# Patient Record
Sex: MALE | Race: WHITE | HISPANIC OR LATINO | Employment: UNEMPLOYED | URBAN - METROPOLITAN AREA
[De-identification: names, ages, dates, MRNs, and addresses within clinical notes are randomized per-mention and may not be internally consistent; named-entity substitution may affect disease eponyms.]

---

## 2018-01-10 NOTE — MISCELLANEOUS
Provider Comments  Provider Comments:   No show for 8/10 appointment        Signatures   Electronically signed by : Racheal Stark OM; Aug 10 2016 11:00AM EST                       (Author)    Electronically signed by : JEYSON Lyn ; Aug 10 2016 11:01AM EST

## 2018-01-18 NOTE — MISCELLANEOUS
Provider Comments  Provider Comments:   PATIENT NO SHOWED 7- APPT  Signatures   Electronically signed by :  Julio Lopez; Sep 19 2016  9:16AM EST                       (Author)

## 2019-09-25 ENCOUNTER — HOSPITAL ENCOUNTER (EMERGENCY)
Facility: HOSPITAL | Age: 24
Discharge: HOME | End: 2019-09-25
Attending: EMERGENCY MEDICINE | Admitting: EMERGENCY MEDICINE
Payer: COMMERCIAL

## 2019-09-25 VITALS
BODY MASS INDEX: 18.17 KG/M2 | TEMPERATURE: 97.8 F | DIASTOLIC BLOOD PRESSURE: 92 MMHG | SYSTOLIC BLOOD PRESSURE: 120 MMHG | HEART RATE: 125 BPM | RESPIRATION RATE: 18 BRPM | WEIGHT: 116 LBS | OXYGEN SATURATION: 99 %

## 2019-09-25 DIAGNOSIS — E10.9 TYPE 1 DIABETES (HCC): Primary | ICD-10-CM

## 2019-09-25 PROCEDURE — 99284 EMERGENCY DEPT VISIT MOD MDM: CPT | Performed by: EMERGENCY MEDICINE

## 2019-09-25 PROCEDURE — 99282 EMERGENCY DEPT VISIT SF MDM: CPT

## 2019-09-25 PROCEDURE — 93005 ELECTROCARDIOGRAM TRACING: CPT

## 2019-09-25 RX ORDER — INSULIN GLARGINE 100 [IU]/ML
20 INJECTION, SOLUTION SUBCUTANEOUS
Qty: 10 ML | Refills: 0 | Status: SHIPPED | OUTPATIENT
Start: 2019-09-25 | End: 2019-11-06

## 2019-09-25 RX ORDER — BLOOD-GLUCOSE METER
1 KIT MISCELLANEOUS AS NEEDED
Qty: 1 EACH | Refills: 0 | Status: SHIPPED | OUTPATIENT
Start: 2019-09-25 | End: 2019-11-06

## 2019-09-25 NOTE — ED ATTENDING ATTESTATION
9/25/2019  I, Mitesh Bowman MD, saw and evaluated the patient  I have discussed the patient with the resident/non-physician practitioner and agree with the resident's/non-physician practitioner's findings, Plan of Care, and MDM as documented in the resident's/non-physician practitioner's note, except where noted  All available labs and Radiology studies were reviewed  I was present for key portions of any procedure(s) performed by the resident/non-physician practitioner and I was immediately available to provide assistance  At this point I agree with the current assessment done in the Emergency Department    I have conducted an independent evaluation of this patient a history and physical is as follows:  Patient is here for refill of his Lantus insulin prescription that he ran out of his no complaints specifically no fever no chills  Exam heart rate is 88 on my exam  Lungs clear heart regular abdomen soft nontender extremities normal skin no rash  Impression medication refill  ED Course         Critical Care Time  Procedures

## 2019-09-25 NOTE — SOCIAL WORK
BARRY received call from Pt's , [place], reporting that the Pt tried to get his script filled at the pharmacy, but the pharmacy would not fill it due to who prescribed it  BARRY spoke with Dr Racquel Guerra to inform him the script would need to come from him and not the resident  CM inquired if he could send script to [facility]  Dr Racquel Guerra would like the pharmacy to call him directly  CM contacted [place] back to inform her of same  [place] will go into [facility] on [address] and have the pharmacist contact Dr Racquel Guerra CM provided ED phone numbers  [place] to call CM back with any issues

## 2019-09-25 NOTE — ED PROVIDER NOTES
History  Chief Complaint   Patient presents with    Medication Refill     Needs prescription for Lantus  Insurance does not kick in until Oct  1      24M presenting due to change in insurance, requiring a refill on his lantus prescription  States he is type 1 diabetic and tests his sugar at home, has always been WNL  Denies any concerns or complaints  Prior to Admission Medications   Prescriptions Last Dose Informant Patient Reported? Taking?   insulin glargine (LANTUS) 100 units/mL subcutaneous injection 9/24/2019 at Unknown time  No Yes   Sig: Lantus Solostar pens  Insulin glargine injection 100u/ml  Take 22 units subcutaneously every evening  Dispense 15 pens   insulin glargine (LANTUS) 100 units/mL subcutaneous injection 9/24/2019 at Unknown time  No Yes   Sig: Inject 22 Units under the skin daily at bedtime   Patient taking differently: Inject 20 Units under the skin daily at bedtime       Facility-Administered Medications: None       Past Medical History:   Diagnosis Date    [facility] )        History reviewed  No pertinent surgical history  History reviewed  No pertinent family history  I have reviewed and agree with the history as documented  Social History     Tobacco Use    Smoking status: Never Smoker    Smokeless tobacco: Never Used   Substance Use Topics    Alcohol use: No    Drug use: No        Review of Systems   Constitutional: Negative for fatigue and fever  HENT: Negative for ear pain and hearing loss  Eyes: Negative for pain  Respiratory: Negative for chest tightness and shortness of breath  Cardiovascular: Negative for chest pain  Gastrointestinal: Negative for abdominal pain, nausea and vomiting  Genitourinary: Negative for difficulty urinating, dysuria and urgency  Musculoskeletal: Negative for back pain  Skin: Negative for rash and wound  Neurological: Negative for syncope and headaches  Psychiatric/Behavioral: Negative for agitation  All other systems reviewed and are negative  Physical Exam  ED Triage Vitals [09/25/19 1020]   Temperature Pulse Respirations Blood Pressure SpO2   97 8 °F (36 6 °C) (!) 125 18 120/92 99 %      Temp Source Heart Rate Source Patient Position - Orthostatic VS BP Location FiO2 (%)   Oral -- -- -- --      Pain Score       No Pain             Orthostatic Vital Signs  Vitals:    09/25/19 1020   BP: 120/92   Pulse: (!) 125       Physical Exam   Constitutional: He is oriented to person, place, and time  He appears well-developed and well-nourished  HENT:   Head: Normocephalic and atraumatic  Eyes: Pupils are equal, round, and reactive to light  Conjunctivae and EOM are normal  Right eye exhibits no discharge  Left eye exhibits no discharge  Neck: Normal range of motion  No JVD present  No tracheal deviation present  Cardiovascular: Normal rate, regular rhythm and normal heart sounds  No murmur heard  Pulmonary/Chest: Effort normal and breath sounds normal  He has no wheezes  Abdominal: Soft  Bowel sounds are normal  He exhibits no distension  There is no tenderness  Musculoskeletal: Normal range of motion  Neurological: He is alert and oriented to person, place, and time  Skin: Skin is warm and dry  He is not diaphoretic  Psychiatric: He has a normal mood and affect  His speech is normal  Cognition and memory are normal    Nursing note and vitals reviewed        ED Medications  Medications - No data to display    Diagnostic Studies  Results Reviewed     None                 No orders to display         Procedures  Procedures        ED Course                               MDM  Number of Diagnoses or Management Options  Type 1 diabetes [facility]):   Diagnosis management comments: Patient provided prescription for diabetes medication  Will follow up with PCP        Disposition  Final diagnoses:   Type 1 diabetes ([address] )     Time reflects when diagnosis was documented in both MDM as applicable and the Disposition within this note     Time User Action Codes Description Comment    9/25/2019 11:18 AM Angela Arias, Carmine Salgado Add [E10 9] Type 1 diabetes [facility])       ED Disposition     ED Disposition Condition Date/Time Comment    Discharge Stable Wed Sep 25, 2019 11:23 AM Rudi Patel discharge to home/self care  Follow-up Information     Follow up With Specialties Details Why Contact Info Additional Information    Lea Choi MD Internal Medicine Schedule an appointment as soon as possible for a visit   [address]       [address] [place] [address] Emergency Department Emergency Medicine Go to  If symptoms worsen [address]  [phone]  ED, [address] [place] [address], [facility]          Discharge Medication List as of 9/25/2019 11:25 AM      START taking these medications    Details   glucose monitoring kit (FREESTYLE) monitoring kit 1 each by Does not apply route as needed (for glucose monitoring), Starting Wed 9/25/2019, Print      !! insulin glargine (LANTUS) 100 units/mL subcutaneous injection Inject 20 Units under the skin daily at bedtime, Starting Wed 9/25/2019, Print       !! - Potential duplicate medications found  Please discuss with provider  CONTINUE these medications which have NOT CHANGED    Details   !! insulin glargine (LANTUS) 100 units/mL subcutaneous injection Lantus Solostar pens  Insulin glargine injection 100u/ml  Take 22 units subcutaneously every evening  Dispense 15 pens, Print      !! insulin glargine (LANTUS) 100 units/mL subcutaneous injection Inject 22 Units under the skin daily at bedtime, Starting 9/28/2016, Until Discontinued, Print       !! - Potential duplicate medications found  Please discuss with provider  No discharge procedures on file  ED Provider  Attending physically available and evaluated Rudi Patel   [facility] managed the patient along with the ED Attending      Electronically Signed by         Amanuel Stratton DO  09/25/19 5285

## 2019-09-26 LAB
ATRIAL RATE: 114 BPM
P AXIS: 81 DEGREES
PR INTERVAL: 124 MS
QRS AXIS: 86 DEGREES
QRSD INTERVAL: 72 MS
QT INTERVAL: 308 MS
QTC INTERVAL: 424 MS
T WAVE AXIS: 79 DEGREES
VENTRICULAR RATE: 114 BPM

## 2019-09-26 PROCEDURE — 93010 ELECTROCARDIOGRAM REPORT: CPT | Performed by: INTERNAL MEDICINE

## 2019-11-05 RX ORDER — BLOOD SUGAR DIAGNOSTIC
STRIP MISCELLANEOUS
COMMUNITY
Start: 2014-05-15 | End: 2019-11-06

## 2019-11-05 RX ORDER — PEN NEEDLE, DIABETIC 31 GX5/16"
NEEDLE, DISPOSABLE MISCELLANEOUS
COMMUNITY
Start: 2014-05-15 | End: 2020-11-15

## 2019-11-05 RX ORDER — BLOOD-GLUCOSE METER
EACH MISCELLANEOUS
COMMUNITY
Start: 2014-05-15 | End: 2019-11-06

## 2019-11-06 ENCOUNTER — OFFICE VISIT (OUTPATIENT)
Dept: INTERNAL MEDICINE CLINIC | Facility: CLINIC | Age: 24
End: 2019-11-06

## 2019-11-06 VITALS
TEMPERATURE: 98.1 F | DIASTOLIC BLOOD PRESSURE: 90 MMHG | HEIGHT: 70 IN | OXYGEN SATURATION: 98 % | SYSTOLIC BLOOD PRESSURE: 114 MMHG | HEART RATE: 114 BPM | BODY MASS INDEX: 15.65 KG/M2 | WEIGHT: 109.35 LBS

## 2019-11-06 DIAGNOSIS — R00.0 TACHYCARDIA: ICD-10-CM

## 2019-11-06 DIAGNOSIS — E10.9 TYPE 1 DIABETES MELLITUS WITHOUT COMPLICATION (HCC): Primary | ICD-10-CM

## 2019-11-06 DIAGNOSIS — Z01.00 ROUTINE EYE EXAM: ICD-10-CM

## 2019-11-06 DIAGNOSIS — G62.9 NEUROPATHY: ICD-10-CM

## 2019-11-06 LAB — SL AMB POCT HEMOGLOBIN AIC: 12.2 (ref ?–6.5)

## 2019-11-06 PROCEDURE — 83036 HEMOGLOBIN GLYCOSYLATED A1C: CPT | Performed by: PHYSICIAN ASSISTANT

## 2019-11-06 PROCEDURE — 99203 OFFICE O/P NEW LOW 30 MIN: CPT | Performed by: PHYSICIAN ASSISTANT

## 2019-11-06 PROCEDURE — 4010F ACE/ARB THERAPY RXD/TAKEN: CPT | Performed by: PHYSICIAN ASSISTANT

## 2019-11-06 PROCEDURE — 3046F HEMOGLOBIN A1C LEVEL >9.0%: CPT | Performed by: PHYSICIAN ASSISTANT

## 2019-11-06 PROCEDURE — 3725F SCREEN DEPRESSION PERFORMED: CPT | Performed by: PHYSICIAN ASSISTANT

## 2019-11-06 RX ORDER — PEN NEEDLE, DIABETIC 30 GX3/16"
NEEDLE, DISPOSABLE MISCELLANEOUS
Qty: 200 EACH | Refills: 11 | Status: SHIPPED | OUTPATIENT
Start: 2019-11-06 | End: 2021-02-16 | Stop reason: SDUPTHER

## 2019-11-06 RX ORDER — LANCETS
EACH MISCELLANEOUS
Qty: 200 EACH | Refills: 11 | Status: SHIPPED | OUTPATIENT
Start: 2019-11-06 | End: 2019-11-26

## 2019-11-06 RX ORDER — LISINOPRIL 2.5 MG/1
2.5 TABLET ORAL DAILY
Qty: 90 TABLET | Refills: 1 | Status: SHIPPED | OUTPATIENT
Start: 2019-11-06 | End: 2020-05-12

## 2019-11-06 NOTE — PROGRESS NOTES
Assessment/Plan:      Diagnoses and all orders for this visit:    Type 1 diabetes mellitus without complication (HCC)  -     POCT hemoglobin A1c  -     insulin glargine (LANTUS SOLOSTAR) 100 units/mL injection pen; Inject 20 Units under the skin daily  -     insulin aspart (NOVOLOG FLEXPEN) 100 Units/mL injection pen; Admin insulin SQ TID with meals according to sliding scale  -     lisinopril (ZESTRIL) 2 5 mg tablet; Take 1 tablet (2 5 mg total) by mouth daily  -     glucose blood (ONE TOUCH ULTRA TEST) test strip; Use to test sugars 3-4 x a day  -     Lancets (ONETOUCH ULTRASOFT) lancets; Use to test sugars 3-4 x a day  -     Insulin Pen Needle (PEN NEEDLES) 32G X 4 MM MISC; Use to admin insulin SQ 4 times a day  -     Ambulatory referral to Ophthalmology; Future  -     Ambulatory referral to Podiatry; Future  -     Ambulatory referral to Endocrinology; Future  -     CBC and differential; Future  -     Comprehensive metabolic panel; Future  -     Microalbumin / creatinine urine ratio  -     TSH, 3rd generation with Free T4 reflex; Future    Neuropathy  -     Ambulatory referral to Podiatry; Future    Tachycardia  -     TSH, 3rd generation with Free T4 reflex; Future    Routine eye exam  -     Ambulatory referral to Ophthalmology; Future    Other orders  -     Discontinue: Blood Glucose Calibration (ACCU-CHEK ACTIVE GLUCOSE CONT) LIQD; by In Vitro route  -     Discontinue: glucose blood (ACCU-CHEK ACTIVE STRIPS) test strip; by In Vitro route 4 (four) times a day  -     Discontinue: Blood Glucose Monitoring Suppl (ACCU-CHEK SAM PLUS) w/Device KIT; by Does not apply route  -     Discontinue: Insulin Syringe-Needle U-100 (BD INSULIN SYRINGE ULTRAFINE) 31G X 5/16" 0 5 ML MISC; by Does not apply route  -     Insulin Pen Needle (PEN NEEDLES 31GX5/16") 31G X 8 MM MISC; by Does not apply route  -     Discontinue: insulin aspart (NOVOLOG FLEXPEN) 100 Units/mL injection pen;  Inject under the skin  -     Discontinue: insulin glargine (LANTUS SOLOSTAR) 100 units/mL injection pen; Inject under the skin      patient is a very pleasant 16-year-old male presenting today with his girlfriend for stab wish mint and type 1 diabetes  Details of HPI and his condition as below  Today his POC A1c is 12 2  He has not been able to check any sugars at home as he ran out of test strips weeks ago  He also has not been compliant on NovoLog but admits to compliance on 20 units of Lantus at bedtime  He states he would like to get back on track  Today I will continue him on Lantus 20 units q h s  but add a NovoLog back  He states in the past he had been using a sliding scale  I will have him use the following sliding scale:    150-200 - 6 units, 200-250 - 8 units, 250-300 10 units, greater than 312 units (similar to what he had been using in the past)  I will have him obtain records of his sugars 3 times a day and certainly a 4th time if he notes hypoglycemic symptoms  I provided him with a sugar log upon leaving and he will bring this back in 2 weeks to review together  He most likely will require increase in his Lantus as well  I do believe with type 1 diabetes and the fact that he is uncontrolled he would most likely benefit from an insulin pump which he is very interested in addition to [place] to monitor sugars if needed  Therefore I will refer him to Endocrinology for specialist follow-ups as he was also established with a neurologist in [place] as well  He will need additional labs including CBC, CMP as well as urine microalbumin  He does have tachycardia which he states is chronic and has never been found to be more than just a faster heart rate, will check thyroid in lieu of this  I will also refer him to Podiatry as he wishes to establish with a podiatrist    He does note some symptoms concerning for neuropathy  He also had some slight diminished sensation in the distal left foot compared to the right  We will schedule him at the clinic  Also given referral for Ophthalmology as I stressed to him the importance of routine eye examinations  I will restart him on Ace inhibitor lisinopril 2 5 mg as he states he had that in the past     He was given refills of lancets, test strips as well as pen needles  Unfortunately despite my recommendations patient refuses vaccinations including the flu vaccine today  Once again we will see patient back in 2-3 weeks and review his blood sugar log and make appropriate adjustments  He also at the end of the visit brought up some concerns regarding back pain that he has been having  I will defer this to next follow-up in a few weeks to further evaluate since it is a nonurgent issue  Chief Complaint   Patient presents with    Establish Care    Diabetes       Subjective:     Patient ID: Modena Peabody is a 21 y o  male  Patient is a 23y/o male here today for establishment with our office and Type I diabetes  He had been living in [facility] for a while and was seeing endocrine  Hasnt seen seen endocrine since 1 year ago  He has been faithfully using the lantus 20units QHS  He has been off of novolog for the past 6-12 months  He has not been checking sugars over past few weeks since running out of test strips  States previous A1C in past was > 14  He does note nerve pain and N/T in feet and legs at times, swelling in feet at times, blurry vision off and on when sugars high or low, especially in AM     Last eye exam: about a year ago, wears glasses  He is requesting refills of both insulin, lancets, test strips, pen needles  states was on lisinopril 2 5mg for his kidneys as well  Review of Systems   Constitutional: Negative  HENT: Negative  Eyes:        As in HPI   Respiratory: Negative  Cardiovascular: Negative  Gastrointestinal: Negative  Endocrine: Positive for polyuria     Genitourinary:        As in HPI Musculoskeletal: Negative  Skin: Negative  Neurological:        As in HPI   Hematological: Negative  Psychiatric/Behavioral: Negative  The following portions of the patient's history were reviewed and updated as appropriate: allergies, current medications, past family history, past medical history, past social history, past surgical history and problem list       Objective:     Physical Exam   Constitutional: He is oriented to person, place, and time  He appears well-developed  No distress  Thin stature, BMI 15 69   Eyes: Pupils are equal, round, and reactive to light  Conjunctivae, EOM and lids are normal    Neck: Phonation normal  Neck supple  Normal carotid pulses present  Carotid bruit is not present  No thyroid mass present  Cardiovascular: Normal rate, regular rhythm, normal heart sounds and normal pulses  Pulses are no weak pulses  Pulses:       Dorsalis pedis pulses are 2+ on the right side, and 2+ on the left side  No LE edema   Pulmonary/Chest: Effort normal and breath sounds normal    Abdominal: Soft  Normal appearance and bowel sounds are normal  There is no tenderness  Feet:   Right Foot:   Skin Integrity: Negative for ulcer, skin breakdown, erythema, warmth, callus or dry skin  Left Foot:   Skin Integrity: Negative for ulcer, skin breakdown, erythema, warmth, callus or dry skin  Lymphadenopathy:        Head (right side): No submandibular and no tonsillar adenopathy present  Head (left side): No submandibular and no tonsillar adenopathy present  Neurological: He is alert and oriented to person, place, and time  He displays no atrophy and no tremor  He exhibits normal muscle tone  Coordination and gait normal    Skin: Skin is intact  Psychiatric: He has a normal mood and affect  His speech is normal and behavior is normal    Vitals reviewed        Vitals:    11/06/19 1406   BP: 114/90   BP Location: Right arm   Patient Position: Sitting   Cuff Size: Adult Pulse: (!) 114   Temp: 98 1 °F (36 7 °C)   TempSrc: Oral   SpO2: 98%   Weight: 49 6 kg (109 lb 5 6 oz)   Height: 5' 10" (1 778 m)     Patient's shoes and socks removed  Right Foot/Ankle   Right Foot Inspection  Skin Exam: skin normal and skin intact no dry skin, no warmth, no callus, no erythema, no maceration, no abnormal color, no pre-ulcer, no ulcer and no callus                          Toe Exam: ROM and strength within normal limits  Sensory     Proprioception: intact   Monofilament testing: intact  Vascular  Capillary refills: < 3 seconds  The right DP pulse is 2+  Left Foot/Ankle  Left Foot Inspection  Skin Exam: skin normal and skin intactno dry skin, no warmth, no erythema, no maceration, normal color, no pre-ulcer, no ulcer and no callus                         Toe Exam: ROM and strength within normal limits                   Sensory     Proprioception: intact  Monofilament: diminished  Vascular  Capillary refills: < 3 seconds  The left DP pulse is 2+  Assign Risk Category:  No deformity present; Loss of protective sensation;  No weak pulses       Risk: 1

## 2019-11-07 ENCOUNTER — TELEPHONE (OUTPATIENT)
Dept: INTERNAL MEDICINE CLINIC | Facility: CLINIC | Age: 24
End: 2019-11-07

## 2019-11-07 DIAGNOSIS — E10.9 TYPE 1 DIABETES MELLITUS WITHOUT COMPLICATION (HCC): Primary | ICD-10-CM

## 2019-11-07 NOTE — TELEPHONE ENCOUNTER
basaglar sent  not sure if we should contact pt to notify of change and why its taking a little longer?

## 2019-11-07 NOTE — TELEPHONE ENCOUNTER
[facility] at [facility] [phone] states they need prior authorization for insulin glargine (LANTUS SOLOSTAR) 100 units/mL injection  prescribed by Nighat Augustine yesterday  11/7/19     Please check into this     Please call the patient to advise it was done    Valentin Acuña states that Lantus is not a covered brand and [place] requires prior authorization     States that the patient needs this specific brand of insulin

## 2019-11-08 NOTE — TELEPHONE ENCOUNTER
I spoke with patient in regards to having to change his insulin from Lantus to [address] and patient was not happy with that decision  I explained to patient how the insurances and formularies work and how frequently they change, patient verbalized understanding but patient informed me that he is very uncomfortable with switching to [address] because he has been using Lantus since he was 4years old  I informed patient that if he is truly that uncomfortable, to help give him a peace of mind I will begin a prior auth for the lantus  I did however, explain to patient that there is a good chance it might be denied and the insurance might require him to try and fail the [address] first before approving the Lantus, but regardless I will give it a shot  Patient verbalized understanding and was ok with that  I informed patient that I will keep him updated on the status of the prior auth  Prior auth started and faxed, will follow up in 2 business days

## 2019-11-11 DIAGNOSIS — E10.9 TYPE 1 DIABETES MELLITUS WITHOUT COMPLICATION (HCC): Primary | ICD-10-CM

## 2019-11-11 NOTE — TELEPHONE ENCOUNTER
Lantus was APPROVED up until 12/31/2019  Please resend LANTUS to pharmacy  Patient aware of approval, prior auth in scanning bin

## 2019-11-18 ENCOUNTER — TELEPHONE (OUTPATIENT)
Dept: INTERNAL MEDICINE CLINIC | Facility: CLINIC | Age: 24
End: 2019-11-18

## 2019-11-18 NOTE — TELEPHONE ENCOUNTER
Left voicemail for patient to call back he have appt schedule for tomorrow and he didn't complete the blood work  If the patient call back reschedule appt and remind him to complete the labs

## 2019-11-20 ENCOUNTER — APPOINTMENT (OUTPATIENT)
Dept: LAB | Facility: CLINIC | Age: 24
End: 2019-11-20
Payer: COMMERCIAL

## 2019-11-20 DIAGNOSIS — E10.9 TYPE 1 DIABETES MELLITUS WITHOUT COMPLICATION (HCC): ICD-10-CM

## 2019-11-20 DIAGNOSIS — R00.0 TACHYCARDIA: ICD-10-CM

## 2019-11-20 LAB
ALBUMIN SERPL BCP-MCNC: 4.2 G/DL (ref 3.5–5)
ALP SERPL-CCNC: 60 U/L (ref 46–116)
ALT SERPL W P-5'-P-CCNC: 13 U/L (ref 12–78)
ANION GAP SERPL CALCULATED.3IONS-SCNC: 8 MMOL/L (ref 4–13)
AST SERPL W P-5'-P-CCNC: 8 U/L (ref 5–45)
BASOPHILS # BLD AUTO: 0.02 THOUSANDS/ΜL (ref 0–0.1)
BASOPHILS NFR BLD AUTO: 0 % (ref 0–1)
BILIRUB SERPL-MCNC: 0.31 MG/DL (ref 0.2–1)
BUN SERPL-MCNC: 11 MG/DL (ref 5–25)
CALCIUM SERPL-MCNC: 9.6 MG/DL (ref 8.3–10.1)
CHLORIDE SERPL-SCNC: 107 MMOL/L (ref 100–108)
CO2 SERPL-SCNC: 28 MMOL/L (ref 21–32)
CREAT SERPL-MCNC: 0.64 MG/DL (ref 0.6–1.3)
CREAT UR-MCNC: 178 MG/DL
EOSINOPHIL # BLD AUTO: 0.06 THOUSAND/ΜL (ref 0–0.61)
EOSINOPHIL NFR BLD AUTO: 1 % (ref 0–6)
ERYTHROCYTE [DISTWIDTH] IN BLOOD BY AUTOMATED COUNT: 11.7 % (ref 11.6–15.1)
GFR SERPL CREATININE-BSD FRML MDRD: 136 ML/MIN/1.73SQ M
GLUCOSE P FAST SERPL-MCNC: 113 MG/DL (ref 65–99)
HCT VFR BLD AUTO: 42.2 % (ref 36.5–49.3)
HGB BLD-MCNC: 13.8 G/DL (ref 12–17)
IMM GRANULOCYTES # BLD AUTO: 0.02 THOUSAND/UL (ref 0–0.2)
IMM GRANULOCYTES NFR BLD AUTO: 0 % (ref 0–2)
LYMPHOCYTES # BLD AUTO: 2.8 THOUSANDS/ΜL (ref 0.6–4.47)
LYMPHOCYTES NFR BLD AUTO: 41 % (ref 14–44)
MCH RBC QN AUTO: 29.4 PG (ref 26.8–34.3)
MCHC RBC AUTO-ENTMCNC: 32.7 G/DL (ref 31.4–37.4)
MCV RBC AUTO: 90 FL (ref 82–98)
MICROALBUMIN UR-MCNC: 108 MG/L (ref 0–20)
MICROALBUMIN/CREAT 24H UR: 61 MG/G CREATININE (ref 0–30)
MONOCYTES # BLD AUTO: 0.57 THOUSAND/ΜL (ref 0.17–1.22)
MONOCYTES NFR BLD AUTO: 8 % (ref 4–12)
NEUTROPHILS # BLD AUTO: 3.35 THOUSANDS/ΜL (ref 1.85–7.62)
NEUTS SEG NFR BLD AUTO: 50 % (ref 43–75)
NRBC BLD AUTO-RTO: 0 /100 WBCS
PLATELET # BLD AUTO: 374 THOUSANDS/UL (ref 149–390)
PMV BLD AUTO: 9.6 FL (ref 8.9–12.7)
POTASSIUM SERPL-SCNC: 4 MMOL/L (ref 3.5–5.3)
PROT SERPL-MCNC: 7.3 G/DL (ref 6.4–8.2)
RBC # BLD AUTO: 4.7 MILLION/UL (ref 3.88–5.62)
SODIUM SERPL-SCNC: 143 MMOL/L (ref 136–145)
TSH SERPL DL<=0.05 MIU/L-ACNC: 1.26 UIU/ML (ref 0.36–3.74)
WBC # BLD AUTO: 6.82 THOUSAND/UL (ref 4.31–10.16)

## 2019-11-20 PROCEDURE — 85025 COMPLETE CBC W/AUTO DIFF WBC: CPT

## 2019-11-20 PROCEDURE — 36415 COLL VENOUS BLD VENIPUNCTURE: CPT

## 2019-11-20 PROCEDURE — 3060F POS MICROALBUMINURIA REV: CPT | Performed by: PHYSICIAN ASSISTANT

## 2019-11-20 PROCEDURE — 80053 COMPREHEN METABOLIC PANEL: CPT

## 2019-11-20 PROCEDURE — 82043 UR ALBUMIN QUANTITATIVE: CPT | Performed by: PHYSICIAN ASSISTANT

## 2019-11-20 PROCEDURE — 82570 ASSAY OF URINE CREATININE: CPT | Performed by: PHYSICIAN ASSISTANT

## 2019-11-20 PROCEDURE — 84443 ASSAY THYROID STIM HORMONE: CPT

## 2019-11-26 ENCOUNTER — OFFICE VISIT (OUTPATIENT)
Dept: INTERNAL MEDICINE CLINIC | Facility: CLINIC | Age: 24
End: 2019-11-26

## 2019-11-26 ENCOUNTER — TELEPHONE (OUTPATIENT)
Dept: INTERNAL MEDICINE CLINIC | Facility: CLINIC | Age: 24
End: 2019-11-26

## 2019-11-26 VITALS
TEMPERATURE: 97.4 F | HEIGHT: 70 IN | HEART RATE: 92 BPM | DIASTOLIC BLOOD PRESSURE: 86 MMHG | BODY MASS INDEX: 16.6 KG/M2 | WEIGHT: 115.96 LBS | SYSTOLIC BLOOD PRESSURE: 112 MMHG

## 2019-11-26 DIAGNOSIS — M54.50 CHRONIC BILATERAL LOW BACK PAIN WITHOUT SCIATICA: ICD-10-CM

## 2019-11-26 DIAGNOSIS — G89.29 CHRONIC BILATERAL LOW BACK PAIN WITHOUT SCIATICA: ICD-10-CM

## 2019-11-26 DIAGNOSIS — E10.9 TYPE 1 DIABETES MELLITUS WITHOUT COMPLICATION (HCC): Primary | ICD-10-CM

## 2019-11-26 PROCEDURE — 1036F TOBACCO NON-USER: CPT | Performed by: PHYSICIAN ASSISTANT

## 2019-11-26 PROCEDURE — 3008F BODY MASS INDEX DOCD: CPT | Performed by: PHYSICIAN ASSISTANT

## 2019-11-26 PROCEDURE — 99214 OFFICE O/P EST MOD 30 MIN: CPT | Performed by: PHYSICIAN ASSISTANT

## 2019-11-26 RX ORDER — MELOXICAM 15 MG/1
15 TABLET ORAL DAILY
Qty: 30 TABLET | Refills: 1 | Status: SHIPPED | OUTPATIENT
Start: 2019-11-26 | End: 2020-11-15

## 2019-11-26 RX ORDER — LANCETS 28 GAUGE
EACH MISCELLANEOUS
Qty: 200 EACH | Refills: 11 | Status: SHIPPED | OUTPATIENT
Start: 2019-11-26

## 2019-11-26 RX ORDER — BLOOD-GLUCOSE METER
KIT MISCELLANEOUS
Qty: 1 EACH | Refills: 0 | Status: SHIPPED | OUTPATIENT
Start: 2019-11-26 | End: 2020-11-15

## 2019-11-26 RX ORDER — CYCLOBENZAPRINE HCL 10 MG
10 TABLET ORAL 3 TIMES DAILY PRN
Qty: 30 TABLET | Refills: 2 | Status: SHIPPED | OUTPATIENT
Start: 2019-11-26 | End: 2021-02-16 | Stop reason: SDUPTHER

## 2019-11-26 RX ORDER — INSULIN LISPRO 100 U/ML
INJECTION, SOLUTION SUBCUTANEOUS
Qty: 5 PEN | Refills: 2 | Status: SHIPPED | OUTPATIENT
Start: 2019-11-26

## 2019-11-26 NOTE — PROGRESS NOTES
Assessment/Plan:    [facility] mellitus ([facility]    Lab Results   Component Value Date    HGBA1C 12 2 (A) 11/06/2019     Patient unfortunately had an insurance issue with his long-acting insulin and it appears the Lantus was not covered and I sent in [place] for him  Patient wanted to be on Lantus so we ran a prior authorization which was approved until 12/31/19  However patient reports being on the [place] because he picked up this insulin before the Lantus had approval   He has been using 20 units daily  Unfortunately he did not communicate to us that the rapid acting NovoLog was not covered so he has not been on a mealtime insulin  He also did not call to notify us that the Accu-Chek glucometer I prescribed was also not covered and he needs freestyle  Freestyle glucometer and supplies sent in to pharmacy  Office staff check with pharmacy and it appears admelog is covered which I will send in to his pharmacy for him  He will use sliding scale for now but may consider more consistent dosing depending on his sugar log  Patient to follow up in 1 month  I did review labs with him today unfortunately his fasting sugar actually looked good at 113  Chronic bilateral low back pain without sciatica  Chronic issue but never evaluated per patient  Patient is very thin and he does have a very prominent spine  Questionable mild lumbar scoliosis  He has no radicular symptoms  Based on young age and lack of trauma and thin stature will send for x-ray of the lumbar spine sooner than later to evaluate  In the meantime meloxicam and cyclobenzaprine prescribed with potential side effects discussed, to help with discomfort  May consider PT versus further evaluation depending on x-ray results         Diagnoses and all orders for this visit:    Type 1 diabetes mellitus without complication (HCC)  -     glucose monitoring kit (FREESTYLE) monitoring kit; Use to test glucose 3-4 x a day  -     Lancets (FREESTYLE) lancets; Use to test glucose 3-4 x a day  -     glucose blood (FREESTYLE TEST STRIPS) test strip; Use to test glucose 3-4 x a day  -     ADMELOG SOLOSTAR 100 units/mL injection pen; Admin insulin SQ TID with meals according to sliding scale    Chronic bilateral low back pain without sciatica  -     XR spine lumbar minimum 4 views non injury; Future  -     meloxicam (MOBIC) 15 mg tablet; Take 1 tablet (15 mg total) by mouth daily With food  -     cyclobenzaprine (FLEXERIL) 10 mg tablet; Take 1 tablet (10 mg total) by mouth 3 (three) times a day as needed for muscle spasms        25y/o male here today for 2 week f/u to diabetes  Unfortunately pt has not been able to comply with recommended diabetes treatment due to insurance coverage issues  He has been able to start the basaglar 20 units daily  Coverage issues sorted out, freestyle glucometer and supplies sent in, in addition to the admelog mealtime insulin TID  Pt advised to keep sugar log, as well as mealtime insulin admin dosing based on sliding scale  He is to bring log to next appt 1 month  Discussed back pain, lumbar xray, meloxicam and flexeril as directed prn  Possible PT vs further workup depending on findings  Chief Complaint   Patient presents with    Follow-up     Labs review ,back pain            Subjective:      Patient ID: Elinor Pratt is a 21 y o  male     25y/o male here today for f/u to Type I diabetes  He currently is on basaglar  He was also unable to check BS at home, as he states the freestyle is covered, not the one [employer] sent in  However he didn't call to notify  He also states the novolog is not covered as well, so he just has been using the basaglar 20units daily  He states he feels ok, no new complaints today regarding his diabetes  He does complain today of chronic LBP for past few years  No known injury or hx of trauma  States pain is generally the lumbar spine and B/L   Denies pain into hips, buttocks or radiation of pain into LE's  Pain can start in AM, exacerbating triggers are mainly with movement, bending, lifting and rtisting  He does note poor posture  He has tried NSAIDS OTC prn which have not been beneficial        The following portions of the patient's history were reviewed and updated as appropriate: allergies, current medications, past family history, past medical history, past social history, past surgical history and problem list     Review of Systems   Constitutional: Negative  Respiratory: Negative  Cardiovascular: Negative  Gastrointestinal: Negative  Genitourinary: Negative  Musculoskeletal:        As in HPI   Neurological: Negative  Psychiatric/Behavioral: Negative  Objective:      /86 (BP Location: Left arm, Patient Position: Sitting, Cuff Size: Adult)   Pulse 92   Temp (!) 97 4 °F (36 3 °C) (Oral)   Ht 5' 10" (1 778 m)   Wt 52 6 kg (115 lb 15 4 oz)   BMI 16 64 kg/m²          Physical Exam   Constitutional: He is oriented to person, place, and time  No distress  Thin stature BMI 16 64   Neck: Neck supple  Cardiovascular: Normal rate, regular rhythm, normal heart sounds and normal pulses  Pulmonary/Chest: Effort normal and breath sounds normal    Abdominal: Soft  Normal appearance and bowel sounds are normal  There is no tenderness  Musculoskeletal:   Pt's spine prominent most likely secondary to thin stature  Questionable lumbar scoliosis with curvature to right  Tenderness over lumbar spine, and associated B/L paraspinals  He has significant restricted AROM of forward flexion of lumbar spine with discomfort  He is able to extend, B/L lateral flex and rotate normally with minimal lumbar discomfort  Strength in LE's is normal and symmetric   Lymphadenopathy:        Head (right side): No submandibular and no tonsillar adenopathy present  Head (left side): No submandibular and no tonsillar adenopathy present     Neurological: He is alert and oriented to person, place, and time  He has normal strength  Coordination and gait normal    Reflex Scores:       Patellar reflexes are 2+ on the right side and 2+ on the left side  Psychiatric: He has a normal mood and affect  Vitals reviewed

## 2019-11-26 NOTE — TELEPHONE ENCOUNTER
Spoke with pharmacist and pharmacist informed me that the Lantus is approved but patient picked up the basaglar before we received the approval, so now patient would have to wait until the next refill is due and then he will receive Lantus from then on out  Also, I asked about patients Novolog and pharmacist informed me that it is not covered but Admelog is  Please send in the Admelog for patient  Thank you!

## 2019-11-26 NOTE — ASSESSMENT & PLAN NOTE
Lab Results   Component Value Date    HGBA1C 12 2 (A) 11/06/2019     Patient unfortunately had an insurance issue with his long-acting insulin and it appears the Lantus was not covered and I sent in [place] for him  Patient wanted to be on Lantus so we ran a prior authorization which was approved until 12/31/19  However patient reports being on the [place] because he picked up this insulin before the Lantus had approval   He has been using 20 units daily  Unfortunately he did not communicate to us that the rapid acting NovoLog was not covered so he has not been on a mealtime insulin  He also did not call to notify us that the Accu-Chek glucometer I prescribed was also not covered and he needs freestyle  Freestyle glucometer and supplies sent in to pharmacy  Office staff check with pharmacy and it appears admelog is covered which I will send in to his pharmacy for him  He will use sliding scale for now but may consider more consistent dosing depending on his sugar log  Patient to follow up in 1 month  I did review labs with him today unfortunately his fasting sugar actually looked good at 113

## 2019-11-26 NOTE — ASSESSMENT & PLAN NOTE
Chronic issue but never evaluated per patient  Patient is very thin and he does have a very prominent spine  Questionable mild lumbar scoliosis  He has no radicular symptoms  Based on young age and lack of trauma and thin stature will send for x-ray of the lumbar spine sooner than later to evaluate  In the meantime meloxicam and cyclobenzaprine prescribed with potential side effects discussed, to help with discomfort  May consider PT versus further evaluation depending on x-ray results

## 2019-11-26 NOTE — TELEPHONE ENCOUNTER
Jumana Andres, can you please look into what issue is with novolog? Pt states was told wasn't covered  Maybe humalog? There was a message out that lantus wasn't covered so we switched to basaglar and I sent that in, then pt wanted lantus so we did prior auth which was approved until 12/31 so I sent that in, but pt states they gave him basaglar anyway  He is using the basaglar, but the pharmacy never contacted us about issue with novolog not being covered, so he hasnt been on rapid acting insulin since last appt a few weeks ago

## 2019-11-26 NOTE — TELEPHONE ENCOUNTER
Ok great, thanks for this info  Please notify pt I sent in admelog to his pharmacy which appears to be the covered mealtime insulin  He will follow sliding scale, and I ask that with the blood sugar log he brings in to next appt that he document next to the sugars how much mealtime insulin he administers  This will be helpful if maybe we can do set units for each meal instead of sliding scale

## 2019-12-03 ENCOUNTER — HOSPITAL ENCOUNTER (EMERGENCY)
Facility: HOSPITAL | Age: 24
Discharge: HOME | End: 2019-12-04
Attending: EMERGENCY MEDICINE
Payer: COMMERCIAL

## 2019-12-03 ENCOUNTER — APPOINTMENT (EMERGENCY)
Dept: RADIOLOGY | Facility: HOSPITAL | Age: 24
End: 2019-12-03
Payer: COMMERCIAL

## 2019-12-03 DIAGNOSIS — R11.2 NAUSEA AND VOMITING: Primary | ICD-10-CM

## 2019-12-03 LAB
ALBUMIN SERPL BCP-MCNC: 4.3 G/DL (ref 3.5–5)
ALP SERPL-CCNC: 64 U/L (ref 46–116)
ALT SERPL W P-5'-P-CCNC: 16 U/L (ref 12–78)
ANION GAP SERPL CALCULATED.3IONS-SCNC: 5 MMOL/L (ref 4–13)
AST SERPL W P-5'-P-CCNC: 11 U/L (ref 5–45)
BACTERIA UR QL AUTO: ABNORMAL /HPF
BASOPHILS # BLD AUTO: 0.02 THOUSANDS/ΜL (ref 0–0.1)
BASOPHILS NFR BLD AUTO: 0 % (ref 0–1)
BETA-HYDROXYBUTYRATE: 0.3 MMOL/L
BILIRUB SERPL-MCNC: 0.7 MG/DL (ref 0.2–1)
BILIRUB UR QL STRIP: NEGATIVE
BUN SERPL-MCNC: 16 MG/DL (ref 5–25)
CALCIUM SERPL-MCNC: 9.9 MG/DL (ref 8.3–10.1)
CHLORIDE SERPL-SCNC: 107 MMOL/L (ref 100–108)
CLARITY UR: CLEAR
CO2 SERPL-SCNC: 29 MMOL/L (ref 21–32)
COLOR UR: YELLOW
CREAT SERPL-MCNC: 0.7 MG/DL (ref 0.6–1.3)
EOSINOPHIL # BLD AUTO: 0.04 THOUSAND/ΜL (ref 0–0.61)
EOSINOPHIL NFR BLD AUTO: 1 % (ref 0–6)
ERYTHROCYTE [DISTWIDTH] IN BLOOD BY AUTOMATED COUNT: 12 % (ref 11.6–15.1)
GFR SERPL CREATININE-BSD FRML MDRD: 131 ML/MIN/1.73SQ M
GLUCOSE SERPL-MCNC: 185 MG/DL (ref 65–140)
GLUCOSE UR STRIP-MCNC: ABNORMAL MG/DL
HCT VFR BLD AUTO: 44.5 % (ref 36.5–49.3)
HGB BLD-MCNC: 14.6 G/DL (ref 12–17)
HGB UR QL STRIP.AUTO: ABNORMAL
IMM GRANULOCYTES # BLD AUTO: 0.01 THOUSAND/UL (ref 0–0.2)
IMM GRANULOCYTES NFR BLD AUTO: 0 % (ref 0–2)
KETONES UR STRIP-MCNC: ABNORMAL MG/DL
LEUKOCYTE ESTERASE UR QL STRIP: NEGATIVE
LIPASE SERPL-CCNC: 356 U/L (ref 73–393)
LYMPHOCYTES # BLD AUTO: 1.53 THOUSANDS/ΜL (ref 0.6–4.47)
LYMPHOCYTES NFR BLD AUTO: 22 % (ref 14–44)
MCH RBC QN AUTO: 29.7 PG (ref 26.8–34.3)
MCHC RBC AUTO-ENTMCNC: 32.8 G/DL (ref 31.4–37.4)
MCV RBC AUTO: 90 FL (ref 82–98)
MONOCYTES # BLD AUTO: 0.56 THOUSAND/ΜL (ref 0.17–1.22)
MONOCYTES NFR BLD AUTO: 8 % (ref 4–12)
MUCOUS THREADS UR QL AUTO: ABNORMAL
NEUTROPHILS # BLD AUTO: 4.68 THOUSANDS/ΜL (ref 1.85–7.62)
NEUTS SEG NFR BLD AUTO: 69 % (ref 43–75)
NITRITE UR QL STRIP: NEGATIVE
NON-SQ EPI CELLS URNS QL MICRO: ABNORMAL /HPF
NRBC BLD AUTO-RTO: 0 /100 WBCS
PH UR STRIP.AUTO: 6 [PH] (ref 4.5–8)
PLATELET # BLD AUTO: 363 THOUSANDS/UL (ref 149–390)
PMV BLD AUTO: 9.2 FL (ref 8.9–12.7)
POTASSIUM SERPL-SCNC: 3.9 MMOL/L (ref 3.5–5.3)
PROT SERPL-MCNC: 7.7 G/DL (ref 6.4–8.2)
PROT UR STRIP-MCNC: ABNORMAL MG/DL
RBC # BLD AUTO: 4.92 MILLION/UL (ref 3.88–5.62)
RBC #/AREA URNS AUTO: ABNORMAL /HPF
SODIUM SERPL-SCNC: 141 MMOL/L (ref 136–145)
SP GR UR STRIP.AUTO: 1.02 (ref 1–1.03)
UROBILINOGEN UR QL STRIP.AUTO: 0.2 E.U./DL
WBC # BLD AUTO: 6.84 THOUSAND/UL (ref 4.31–10.16)
WBC #/AREA URNS AUTO: ABNORMAL /HPF

## 2019-12-03 PROCEDURE — 36415 COLL VENOUS BLD VENIPUNCTURE: CPT | Performed by: EMERGENCY MEDICINE

## 2019-12-03 PROCEDURE — 96361 HYDRATE IV INFUSION ADD-ON: CPT

## 2019-12-03 PROCEDURE — 80053 COMPREHEN METABOLIC PANEL: CPT | Performed by: EMERGENCY MEDICINE

## 2019-12-03 PROCEDURE — 82010 KETONE BODYS QUAN: CPT | Performed by: EMERGENCY MEDICINE

## 2019-12-03 PROCEDURE — 81001 URINALYSIS AUTO W/SCOPE: CPT

## 2019-12-03 PROCEDURE — 99284 EMERGENCY DEPT VISIT MOD MDM: CPT

## 2019-12-03 PROCEDURE — 96376 TX/PRO/DX INJ SAME DRUG ADON: CPT

## 2019-12-03 PROCEDURE — 96374 THER/PROPH/DIAG INJ IV PUSH: CPT

## 2019-12-03 PROCEDURE — 83690 ASSAY OF LIPASE: CPT | Performed by: EMERGENCY MEDICINE

## 2019-12-03 PROCEDURE — 74177 CT ABD & PELVIS W/CONTRAST: CPT

## 2019-12-03 PROCEDURE — 85025 COMPLETE CBC W/AUTO DIFF WBC: CPT | Performed by: EMERGENCY MEDICINE

## 2019-12-03 PROCEDURE — 99285 EMERGENCY DEPT VISIT HI MDM: CPT | Performed by: EMERGENCY MEDICINE

## 2019-12-03 RX ORDER — ONDANSETRON 2 MG/ML
4 INJECTION INTRAMUSCULAR; INTRAVENOUS ONCE
Status: COMPLETED | OUTPATIENT
Start: 2019-12-03 | End: 2019-12-03

## 2019-12-03 RX ADMIN — ONDANSETRON 4 MG: 2 INJECTION INTRAMUSCULAR; INTRAVENOUS at 21:00

## 2019-12-03 RX ADMIN — IOHEXOL 100 ML: 350 INJECTION, SOLUTION INTRAVENOUS at 23:36

## 2019-12-03 RX ADMIN — SODIUM CHLORIDE 1000 ML: 0.9 INJECTION, SOLUTION INTRAVENOUS at 21:00

## 2019-12-03 RX ADMIN — ONDANSETRON 4 MG: 2 INJECTION INTRAMUSCULAR; INTRAVENOUS at 23:44

## 2019-12-03 RX ADMIN — SODIUM CHLORIDE 1000 ML: 0.9 INJECTION, SOLUTION INTRAVENOUS at 23:00

## 2019-12-04 VITALS
OXYGEN SATURATION: 100 % | TEMPERATURE: 98.1 F | HEART RATE: 96 BPM | DIASTOLIC BLOOD PRESSURE: 76 MMHG | BODY MASS INDEX: 16.46 KG/M2 | RESPIRATION RATE: 16 BRPM | WEIGHT: 115 LBS | HEIGHT: 70 IN | SYSTOLIC BLOOD PRESSURE: 115 MMHG

## 2019-12-04 RX ORDER — METOCLOPRAMIDE 10 MG/1
10 TABLET ORAL EVERY 6 HOURS
Qty: 30 TABLET | Refills: 0 | Status: SHIPPED | OUTPATIENT
Start: 2019-12-04 | End: 2019-12-09

## 2019-12-04 NOTE — ED ATTENDING ATTESTATION
12/3/2019  I, Agutso Webber MD, saw and evaluated the patient  I have discussed the patient with the resident/non-physician practitioner and agree with the resident's/non-physician practitioner's findings, Plan of Care, and MDM as documented in the resident's/non-physician practitioner's note, except where noted  All available labs and Radiology studies were reviewed  I was present for key portions of any procedure(s) performed by the resident/non-physician practitioner and I was immediately available to provide assistance  At this point I agree with the current assessment done in the Emergency Department  I have conducted an independent evaluation of this patient a history and physical is as follows:    OA: 22 y/o m with IDDM who presents with intermittent nausea and vomiting x 2 months who presents today with ongoing nausea  Vomitus is non-bilious and non-bloody but per pt and wife " does not smell good " Denies urinary sxms  Mildly decreased PO intake but tolerates PO  Notes the sxms come in waves and he will be good for a few days and then develop nausea again  Also intermittently constipated, no bloody stools  Feels like he needs to go but hasn't today  No fevers/chills  No cp/sob  NO dizziness/lightheadedness  No rash  No known sick contacts  NO previous abdominal surgeries  Notes sugars are well controlled and compliant with medications  Did see his doctor approximately one week ago for back pain  Started muscle relaxants at that time  PE, well developed m, NAD, VSS, NC/AT, MMM, clear sclera/conjunctiva, neck supple/FROM, RR, lungs CTAB, abd soft, +BS, -r/g, - edema, - calf ttp, + 2 distal pulses and capillary refill < 2 sec, AAO  A/p n/v, intermittent x months with h/o IDDM  Check labs, imaging, IVF hydration, antiemetic and re-evaluation  ED Course     Pt feels improved after vomiting  Asking to PO challenge  Re-eval with PO challenge pending at end of shift       Critical Care Time  Procedures

## 2019-12-04 NOTE — ED NOTES
Pt provided water and crackers at this time, will continue to monitor        Kaycee Cunningham, MARIA TREESA  12/04/19 [id number]

## 2019-12-04 NOTE — ED NOTES
Patient transported to [facility], [address] [place] [address]  12/03/19 6429

## 2019-12-05 RX ORDER — BLOOD-GLUCOSE METER
KIT MISCELLANEOUS
Refills: 0 | COMMUNITY
Start: 2019-11-26 | End: 2020-11-15

## 2019-12-06 ENCOUNTER — OFFICE VISIT (OUTPATIENT)
Dept: INTERNAL MEDICINE CLINIC | Facility: CLINIC | Age: 24
End: 2019-12-06

## 2019-12-06 VITALS
HEART RATE: 83 BPM | HEIGHT: 70 IN | BODY MASS INDEX: 17.55 KG/M2 | WEIGHT: 122.58 LBS | DIASTOLIC BLOOD PRESSURE: 60 MMHG | TEMPERATURE: 97.4 F | SYSTOLIC BLOOD PRESSURE: 90 MMHG

## 2019-12-06 DIAGNOSIS — R11.2 NAUSEA AND VOMITING, INTRACTABILITY OF VOMITING NOT SPECIFIED, UNSPECIFIED VOMITING TYPE: ICD-10-CM

## 2019-12-06 DIAGNOSIS — E10.9 TYPE 1 DIABETES MELLITUS WITHOUT COMPLICATION (HCC): Primary | ICD-10-CM

## 2019-12-06 PROCEDURE — 1036F TOBACCO NON-USER: CPT | Performed by: PHYSICIAN ASSISTANT

## 2019-12-06 PROCEDURE — 3008F BODY MASS INDEX DOCD: CPT | Performed by: PHYSICIAN ASSISTANT

## 2019-12-06 PROCEDURE — 99214 OFFICE O/P EST MOD 30 MIN: CPT | Performed by: PHYSICIAN ASSISTANT

## 2019-12-06 NOTE — PROGRESS NOTES
Assessment/Plan:     Diagnoses and all orders for this visit:    Type 1 diabetes mellitus without complication (HCC)    Nausea and vomiting, intractability of vomiting not specified, unspecified vomiting type      patient is a 19-year-old male presenting today for ED follow-up  He was seen in the emergency department on 12/3 for intermittent nausea and vomiting episodes for the past 1 and half months  He states that he has vomiting about once or twice a week without any known trigger  Patient had a CT of the abdomen and pelvis which was unremarkable  He is nontender to palpation and was advised   Iam Barrientos There is question that his symptoms may be attributed to gastroparesis as he was lost to treatment from previously living in [place] and recently establishing with us here in [facility] about a month or so ago  His etiology of GI symptoms are unclear  He states he has actually been feeling okay for the past few days and has not had any nausea or vomiting episodes  He is generally eating 3-4 small meals a day but does admit to eating high fatty meals because his family eats a lot of fried or greasy food and also a lot of carbs  I do suspect possible gastroparesis in lieu of his poorly controlled diabetes  It does not seem related to gastritis or acid reflux as he wax related symptoms  Since he has been doing a little better overall now that his sugars are a little better controlled on his insulins he elects to wait until his next follow-up with me in 2 weeks to see how GI symptoms do  I did explain to patient that treatment for gastroparesis is symptomatic in addition to getting control of the underlying diabetes and controlling sugars as much as possible      I also did start him on meloxicam as needed for back pain about 2-3 weeks ago so I do question GI intolerance from that however patient's symptoms were even prior to starting the meloxicam   I did discuss with patient GI symptoms and side effects to meloxicam and he expresses understanding as well as the need to take it with food  Regarding his diabetes, he does note that overall his sugars have been improving earlier in the day and he is compliant using his mealtime insulin 3 times a day in addition to the Lantus q h s     For now he is using sliding scale for mealtime insulin  I will see him back in 2 weeks as scheduled on 12/24 for blood sugar log  Once again we will hold off on GI referral for now  He will continue Reglan as needed  I did advised strict diabetic diet in addition to avoiding greasy, high fat foods that can also be difficult to digest   Will consider GI referral if vomiting episodes continue  Chief Complaint   Patient presents with    Follow-up     ER follow up for nauseas and vomiting ,patient report the he feel better   Diabetes follow up       Subjective:      Patient ID: Whitley Ivy is a 21 y o  male     25y/o male here today for f/u to ED visit for N/V, possible gastroparesis and gastroenteritis  He states he has had some N/V on occasion the past month, which he didn't tell me at past visits  He states he will have days that he is fine,  then have some vomiting for 1-2 days with some constipation  Denies abdominal pain  Denies vomiting being triggered after eating  He does note a foul burp prior  He denies acid reflux, epigastric pain, chets burning  He did not bring a sugar log today  He states sugars have been good in Am and afternoon  Tend to be higher towards dinner and bedtime  Generally 100's in AM, highest 156, yesterday 83  Before lunch time 190's-200, highest 365 at dinner time but after thanksgiving  The following portions of the patient's history were reviewed and updated as appropriate: allergies, current medications, past family history, past medical history, past social history, past surgical history and problem list     Review of Systems   Constitutional: Negative  Respiratory: Negative  Cardiovascular: Negative  Gastrointestinal:        As in HPI   Genitourinary: Negative  Neurological: Negative  Psychiatric/Behavioral: Negative  Objective:      BP 90/60 (BP Location: Left arm, Patient Position: Sitting, Cuff Size: Adult)   Pulse 83   Temp (!) 97 4 °F (36 3 °C) (Oral)   Ht 5' 10" (1 778 m)   Wt 55 6 kg (122 lb 9 2 oz)   BMI 17 59 kg/m²          Physical Exam   Constitutional: He is oriented to person, place, and time  He appears well-developed and well-nourished  No distress  Thin stature BMI 17 59   Neck: Neck supple  Cardiovascular: Normal rate, regular rhythm and normal heart sounds  Pulmonary/Chest: Effort normal and breath sounds normal    Abdominal: Soft  Normal appearance and bowel sounds are normal  There is no tenderness  Lymphadenopathy:        Head (right side): No submandibular and no tonsillar adenopathy present  Head (left side): No submandibular and no tonsillar adenopathy present  Neurological: He is alert and oriented to person, place, and time  Psychiatric: He has a normal mood and affect  Vitals reviewed

## 2020-01-23 DIAGNOSIS — M54.50 CHRONIC BILATERAL LOW BACK PAIN WITHOUT SCIATICA: ICD-10-CM

## 2020-01-23 DIAGNOSIS — G89.29 CHRONIC BILATERAL LOW BACK PAIN WITHOUT SCIATICA: ICD-10-CM

## 2020-01-23 RX ORDER — MELOXICAM 15 MG/1
15 TABLET ORAL DAILY
Qty: 30 TABLET | Refills: 0 | OUTPATIENT
Start: 2020-01-23

## 2020-01-23 NOTE — TELEPHONE ENCOUNTER
Please call pt - I am not refilling the meloxicam due to his stomach issues and vomiting he had when I saw him in late November, he was supposed to f/u in 2 weeks from then for the GI issues and review his sugar log for his diabetes and he appears to have cancelled/no showed   He needs to reschedule appt and bring sugar log to review

## 2020-05-12 DIAGNOSIS — E10.9 TYPE 1 DIABETES MELLITUS WITHOUT COMPLICATION (HCC): ICD-10-CM

## 2020-05-12 PROCEDURE — 4010F ACE/ARB THERAPY RXD/TAKEN: CPT | Performed by: PHYSICIAN ASSISTANT

## 2020-05-12 PROCEDURE — 4010F ACE/ARB THERAPY RXD/TAKEN: CPT | Performed by: INTERNAL MEDICINE

## 2020-05-12 RX ORDER — LISINOPRIL 2.5 MG/1
TABLET ORAL
Qty: 90 TABLET | Refills: 1 | Status: SHIPPED | OUTPATIENT
Start: 2020-05-12 | End: 2021-05-10

## 2020-07-13 ENCOUNTER — OFFICE VISIT (OUTPATIENT)
Dept: INTERNAL MEDICINE CLINIC | Facility: CLINIC | Age: 25
End: 2020-07-13

## 2020-07-13 VITALS
WEIGHT: 111.55 LBS | BODY MASS INDEX: 16.01 KG/M2 | DIASTOLIC BLOOD PRESSURE: 70 MMHG | OXYGEN SATURATION: 98 % | HEART RATE: 111 BPM | TEMPERATURE: 97.7 F | SYSTOLIC BLOOD PRESSURE: 90 MMHG

## 2020-07-13 DIAGNOSIS — M54.50 CHRONIC BILATERAL LOW BACK PAIN WITHOUT SCIATICA: ICD-10-CM

## 2020-07-13 DIAGNOSIS — Z11.4 SCREENING FOR HIV (HUMAN IMMUNODEFICIENCY VIRUS): ICD-10-CM

## 2020-07-13 DIAGNOSIS — G89.29 CHRONIC BILATERAL LOW BACK PAIN WITHOUT SCIATICA: ICD-10-CM

## 2020-07-13 DIAGNOSIS — E10.9 TYPE 1 DIABETES MELLITUS WITHOUT COMPLICATION (HCC): Primary | ICD-10-CM

## 2020-07-13 DIAGNOSIS — R63.4 WEIGHT LOSS: ICD-10-CM

## 2020-07-13 LAB — SL AMB POCT HEMOGLOBIN AIC: 11.4 (ref ?–6.5)

## 2020-07-13 PROCEDURE — 3046F HEMOGLOBIN A1C LEVEL >9.0%: CPT | Performed by: INTERNAL MEDICINE

## 2020-07-13 PROCEDURE — 83036 HEMOGLOBIN GLYCOSYLATED A1C: CPT | Performed by: PHYSICIAN ASSISTANT

## 2020-07-13 PROCEDURE — 99214 OFFICE O/P EST MOD 30 MIN: CPT | Performed by: PHYSICIAN ASSISTANT

## 2020-07-13 PROCEDURE — 3046F HEMOGLOBIN A1C LEVEL >9.0%: CPT | Performed by: PHYSICIAN ASSISTANT

## 2020-07-13 PROCEDURE — 1036F TOBACCO NON-USER: CPT | Performed by: PHYSICIAN ASSISTANT

## 2020-07-13 NOTE — PROGRESS NOTES
Assessment/Plan:     Diagnoses and all orders for this visit:    Type 1 diabetes mellitus without complication ([facility] [address] )  -     POCT hemoglobin A1c  -     Ambulatory referral to Endocrinology; Future  -     CBC and differential; Future  -     Comprehensive metabolic panel; Future  -     TSH, 3rd generation with Free T4 reflex; Future  -     Ambulatory referral to Diabetic Education; Future    Chronic bilateral low back pain without sciatica  -     XR spine lumbar minimum 4 views non injury; Future    Weight loss  -     CBC and differential; Future  -     Comprehensive metabolic panel; Future  -     TSH, 3rd generation with Free T4 reflex; Future    Screening for HIV (human immunodeficiency virus)  -     HIV 1/2 Antigen/Antibody (4th Generation) w Reflex [facility]; Future      patient is a 58-year-old male presenting today to follow-up for his type 1 diabetes  Unfortunately his condition remains uncontrolled with POC A1c 11 4, previously 12 2  Unfortunately there is a compliance factor with patient scheduling appropriate follow-ups with me and he also has yet to schedule an appointment with endocrinology which he was referred to back in November  I explained to patient that I strongly feel he needs an insulin pump based on his inability to balance his meals, sugars and insulin  I had a long discussion with patient today about the importance of bring in a blood sugar and food log  I also explained to patient that even though he is expressing most of his sugars are below 100 when he checks them these blood sugar levels are not indicative of an A1c test of 11 4 which he expresses understanding  Unfortunately patient does lack in appetite and only eats about 2 meals a day which also complicates his diabetic treatment  For now I will have him continue Lantus 20 mg q h s  as to avoid hypoglycemia in the morning    He notes still having sugars in the 70s and 80s in the morning even when he does eat dinner around 5 or 6 at night   I will continue him on his mealtime insulin with a sliding scale 6 units, 8 units or 10 units as discussed in the past    I encouraged he try to eat 3 meals a day but consideration for diabetic diet avoiding high carbon high sugar foods and drinks that were reviewed with him in detail today  If he does not feel like eating I did recommend considering glucerna, which I explained our safer diabetics because they are low in calories in sugar and high in protein and can at least continue to support him with the protein and maintaining muscle mass  I will refer patient again to endocrinology as with his type 1 diabetes he will need further management and possibly an insulin pump as discussed  I also will refer him to our diabetic nutritionist to further assist with how to balance meals and carb count  He was advised to continue checking sugars but keep a paper log with sugar readings as well as what he eats  He should be checking fasting sugar in the morning, preprandial sugars before administering ill time insulin and I also recommended consideration of postprandial sugars 1-2 hours after eating  Patient to continue on lisinopril 2 5 mg for microalbuminuria  Difficult to tell if there should be concerned regarding patient's weight loss as according to last weight listed at 122  he is 111lb today and would have lost 111 lb since December  However 3 days prior he was 115 on December 3rd  Either way we will need to keep a lookout for this  Certainly this could be secondary to his lack of appetite and his uncontrolled diabetes  Will check CBC, CMP and thyroid  Patient also asking for updated lumbar x-ray as he never got this done following our discussion last year  New order given  We will plan on seeing him back in 2 weeks to review blood sugar and diet log    I discussed getting iris exam before leaving the office today as he did not follow through with ophthalmology referral I provided him with back in November  He declines today but states he will consider at next visit  Chief Complaint   Patient presents with    Follow-up       Subjective:      Patient ID: Mayank Boyle is a 31 y o  male  26y/o male here today for f/u to diabetes, f/u to GI sxs  POC A1C in office today 11 4, previously 12 2  He has not been compliant with endo appt or f/u  Last visit 12/6/19 and was advised 2 week f/u  He did not bring a sugar log with him  He states most AM's it is 70's-90's  He has had a sugar 190 and 203 but rare  He also checks sugars before he eats lunch or dinner and usually 120-130's  States he doesn't get 200's or 300's often  He reports compliance on lantus 20 units before bed      states eating habits are very sporadic  He does eat breakfast every AM but throughout the day he may not eat much  He may only eat 1 more meal  states its generally because he doesn't feel hungry, sometimes nausea  He only uses mealtime insulin before he eats, would skip insulin if he doesn't eat  He follows sliding scale but cannot remember  States usually 6 units  States sometimes sugars dropping to 70's or 60's after meals  Recently changed his diet, For breakfast he will eat scrambled eggs/omlet or 1 boiled egg, just started eating healthier about 3-4 weeks ago  States he was still getting sugar dropping when eating a full meal of pancakes and syrup and frazier and eggs, which he would ear prior to dietary changes  There has also been a documented 11lb weight loss since dec 2019 (today 111lb)  However, 12/6 was 122, but three days prior 12/3 he was 115lb, so not sure which weight is accurate  States bowel habits are back to normal, has not had diarrhea, constipation or blood in the stool  Denies abdominal pain  Just occasional nausea and decreased appetite        The following portions of the patient's history were reviewed and updated as appropriate: allergies, current medications, past family history, past medical history, past social history, past surgical history and problem list     Review of Systems   Constitutional: Negative  Respiratory: Negative  Cardiovascular: Negative  Gastrointestinal:        As in HPI   Genitourinary: Negative  Musculoskeletal:        Pt requesting script for lumbar spine xray - never got done from last year  Neurological: Negative  Psychiatric/Behavioral: Negative  Objective:      BP 90/70 (BP Location: Left arm, Patient Position: Sitting, Cuff Size: Adult)   Pulse (!) 111   Temp 97 7 °F (36 5 °C) (Temporal)   Wt 50 6 kg (111 lb 8 8 oz)   SpO2 98%   BMI 16 01 kg/m²          Physical Exam   Constitutional: He is oriented to person, place, and time  He appears well-developed  No distress  Thin stature - BMI 16   Neck: Neck supple  Normal carotid pulses present  Carotid bruit is not present  Cardiovascular: Normal rate, regular rhythm, normal heart sounds and normal pulses  Pulmonary/Chest: Effort normal and breath sounds normal    Abdominal: Soft  Normal appearance and bowel sounds are normal  There is no tenderness  Lymphadenopathy:        Head (right side): No submandibular and no tonsillar adenopathy present  Head (left side): No submandibular and no tonsillar adenopathy present  Neurological: He is alert and oriented to person, place, and time  Psychiatric: He has a normal mood and affect  Vitals reviewed  BMI Counseling: Body mass index is 16 01 kg/m²  The BMI is below normal  Patient referred to nutritionist due to patient being underweight

## 2020-07-20 ENCOUNTER — TELEMEDICINE (OUTPATIENT)
Dept: DIABETES SERVICES | Facility: CLINIC | Age: 25
End: 2020-07-20
Payer: COMMERCIAL

## 2020-07-20 ENCOUNTER — TELEPHONE (OUTPATIENT)
Dept: DIABETES SERVICES | Facility: CLINIC | Age: 25
End: 2020-07-20

## 2020-07-20 DIAGNOSIS — E10.40 TYPE 1 DIABETES MELLITUS WITH DIABETIC NEUROPATHY (HCC): ICD-10-CM

## 2020-07-20 PROCEDURE — G0108 DIAB MANAGE TRN  PER INDIV: HCPCS

## 2020-07-20 NOTE — PROGRESS NOTES
Virtual Regular Visit      Assessment/Plan:    Problem List Items Addressed This Visit     None               Reason for visit is   Chief Complaint   Patient presents with    Virtual Regular Visit        Encounter provider Wing Sunday RN    Provider located at [address] [place] [address],[address]  [address] [address]      Recent Visits  Date Type Provider Dept   07/13/20 Office Visit Delmis Juarez, [facility] recent visits within past 7 days and meeting all other requirements     Future Appointments  No visits were found meeting these conditions  Showing future appointments within next 150 days and meeting all other requirements        The patient was identified by name and date of birth  BRADEN Nguyen was informed that this is a telemedicine visit and that the visit is being conducted through [employer]  My office door was closed  No one else was in the room  He acknowledged consent and understanding of privacy and security of the video platform  The patient has agreed to participate and understands they can discontinue the visit at any time  Patient is aware this is a billable service  Subjective  BRADEN Nguyen is a 31 y o  male, type 1 diabetes   HPI     Past Medical History:   Diagnosis Date    Diabetes mellitus ([facility] )        No past surgical history on file      Current Outpatient Medications   Medication Sig Dispense Refill    ADMELOG SOLOSTAR 100 units/mL injection pen Admin insulin SQ TID with meals according to sliding scale 5 pen 2    Blood Glucose Monitoring Suppl (FREESTYLE FREEDOM LITE) w/Device KIT USE TO TEST GLUCOSE 3-4 X A DAY  0    cyclobenzaprine (FLEXERIL) 10 mg tablet Take 1 tablet (10 mg total) by mouth 3 (three) times a day as needed for muscle spasms 30 tablet 2    glucose blood (FREESTYLE TEST STRIPS) test strip Use to test glucose 3-4 x a day 200 each 11    glucose monitoring kit (FREESTYLE) monitoring kit Use to test glucose 3-4 x a day 1 each 0    insulin glargine (LANTUS SOLOSTAR) 100 units/mL injection pen Inject 20 Units under the skin daily at bedtime 5 pen 5    Insulin Pen Needle (PEN NEEDLES 31GX5/16") 31G X 8 MM MISC by Does not apply route      Insulin Pen Needle (PEN NEEDLES) 32G X 4 MM MISC Use to admin insulin SQ 4 times a day 200 each 11    Lancets (FREESTYLE) lancets Use to test glucose 3-4 x a day 200 each 11    lisinopril (ZESTRIL) 2 5 mg tablet TAKE 1 TABLET BY MOUTH EVERY DAY 90 tablet 1    meloxicam (MOBIC) 15 mg tablet Take 1 tablet (15 mg total) by mouth daily With food 30 tablet 1     No current facility-administered medications for this visit  No Known Allergies    Review of Systems    Video Exam    There were no vitals filed for this visit  Physical Exam     I spent 100 minutes directly with the patient during this visit      [facility] acknowledges that he has consented to an online visit or consultation  He understands that the online visit is based solely on information provided by him, and that, in the absence of a face-to-face physical evaluation by the physician, the diagnosis he receives is both limited and provisional in terms of accuracy and completeness  This is not intended to replace a full medical face-to-face evaluation by the physician  [facility] understands and accepts these terms  Claudia Melendez was diagnosed with type 1 diabetes at the age of 4   He has been living in this area for the past and is scheduled for December to meet with and Endocrinologist at the medical clinic  He is being followed by a family pcp for his diabetes  His doctor has referred him Intro to pumping class  Claudia Melendez is currently on Lantus 22 units at 7 pm, and is using Admelog for his meals bolus doses  He is using a correction scale    He usually only eats 2 meals a day breakfast and lunch, he does have some snacks during the day  He reports that he is checking his glucoses with a Freestyle meter 3 - 4 times a day, but doesn't always remember to test, he is trying to get better at this  He is administering his insulin in his arms and his monther will usually give his evening dose  He denies problems with self injecting  He reports glucoses are usually n the 70 - 80 range in the morning and 120 - 180 at lunch and 200's at dinner  Reports 2 -3 lows a week usually in the morning  Treats with 15 grams of carbohydrates and rechecks  He dose have Glucagon Emergency kit and Ketone testing strips at home  Exercise consists of 2 times a week 30 minute walk  Intro to Pumping Class    Session included :  - Pump features - basal, bolus settings, CGM integrated pumps  - Reservoirs and cartridges  -Type of insulin used in pump  -Infusion sets  - Demonstration of Medtronic, Tandem and OmniPod  -Contact information for pump companies and brochures for the pumps  - Pump features comparison handout  - Pump start process    When he decides on the pump, he  are to contact the pump company and the Endocrinology office  When their pump is shipped they are to contact the Diabetes Education office to schedule pump skills class  He is interested in obtaining a sensor,  I have asked him to review the pump information and when he decide on a sensor his doctor can order it  He agrees to meet with a dietitian for carb counting and flexible insulin dosing  This will need to be done prior to his pump start  We also discussed him starting on a CGM prior to th pump start

## 2020-07-20 NOTE — PATIENT INSTRUCTIONS
Go to the pump company web site to view video on pumps,    Download the t:simulator marilynn to see how the Tandem pump works  Schedule appointment with dietitian for carb counting and flexible insulin dosing      Sign up for my chart

## 2020-07-21 NOTE — TELEPHONE ENCOUNTER
Since he came to [facility]  to see you, just schedule him with one of the RDs there for CC  Please make sure they let his PCP Summer Zhang know  Thanks

## 2020-07-22 ENCOUNTER — TELEPHONE (OUTPATIENT)
Dept: DIABETES SERVICES | Facility: CLINIC | Age: 25
End: 2020-07-22

## 2020-07-23 NOTE — TELEPHONE ENCOUNTER
Iris Her, can you call him have scheduled for carb counting/fexible insulin dosing    It can be done virtual  tx

## 2020-07-28 ENCOUNTER — TELEMEDICINE (OUTPATIENT)
Dept: DIABETES SERVICES | Facility: CLINIC | Age: 25
End: 2020-07-28
Payer: COMMERCIAL

## 2020-07-28 DIAGNOSIS — E10.9 TYPE 1 DIABETES MELLITUS WITHOUT COMPLICATION (HCC): Primary | ICD-10-CM

## 2020-07-28 PROCEDURE — G0108 DIAB MANAGE TRN  PER INDIV: HCPCS | Performed by: DIETITIAN, REGISTERED

## 2020-07-28 NOTE — PROGRESS NOTES
Virtual Regular Visit      Assessment/Plan:    Problem List Items Addressed This Visit        Endocrine    Type 1 diabetes mellitus ([place] [address] ) - Primary               Reason for visit is   Chief Complaint   Patient presents with    Diabetes Type 1    Virtual Regular Visit        Encounter provider Shanice Carver    Provider located at [address] [facility],[address]  75 [facility] [id number] [place] [id number]      Recent Visits  Date Type Provider Dept   07/22/20 Telephone Pankaj Adamson RN [facility] [address] recent visits within past 7 days and meeting all other requirements     Today's Visits  Date Type Provider Dept   07/28/20 [address] Shannon Cobb [facility] [address]   Showing today's visits and meeting all other requirements     Future Appointments  No visits were found meeting these conditions  Showing future appointments within next 150 days and meeting all other requirements        The patient was identified by name and date of birth  BRADEN Nguyen was informed that this is a telemedicine visit and that the visit is being conducted through [employer]  My office door was closed  No one else was in the room  He acknowledged consent and understanding of privacy and security of the video platform  The patient has agreed to participate and understands they can discontinue the visit at any time  Patient is aware this is a billable service  Subjective  BRADEN Nguyen is a 31 y o  male , please see education documentation below   HPI     Past Medical History:   Diagnosis Date    Diabetes mellitus ([place] [address] )        No past surgical history on file      Current Outpatient Medications   Medication Sig Dispense Refill    ADMELOG SOLOSTAR 100 units/mL injection pen Admin insulin SQ TID with meals according to sliding scale 5 pen 2    Blood Glucose Monitoring Suppl (FREESTYLE FREEDOM LITE) w/Device KIT USE TO TEST GLUCOSE 3-4 X A DAY  0    glucose blood (FREESTYLE TEST STRIPS) test strip Use to test glucose 3-4 x a day 200 each 11    glucose monitoring kit (FREESTYLE) monitoring kit Use to test glucose 3-4 x a day 1 each 0    insulin glargine (LANTUS SOLOSTAR) 100 units/mL injection pen Inject 20 Units under the skin daily at bedtime 5 pen 5    Insulin Pen Needle (PEN NEEDLES) 32G X 4 MM MISC Use to admin insulin SQ 4 times a day 200 each 11    Lancets (FREESTYLE) lancets Use to test glucose 3-4 x a day 200 each 11    cyclobenzaprine (FLEXERIL) 10 mg tablet Take 1 tablet (10 mg total) by mouth 3 (three) times a day as needed for muscle spasms 30 tablet 2    Insulin Pen Needle (PEN NEEDLES 31GX5/16") 31G X 8 MM MISC by Does not apply route      lisinopril (ZESTRIL) 2 5 mg tablet TAKE 1 TABLET BY MOUTH EVERY DAY 90 tablet 1    meloxicam (MOBIC) 15 mg tablet Take 1 tablet (15 mg total) by mouth daily With food 30 tablet 1     No current facility-administered medications for this visit  No Known Allergies    Review of Systems    Video Exam    There were no vitals filed for this visit  Physical Exam     I spent 50 minutes directly with the patient during this visit      [facility] acknowledges that he has consented to an online visit or consultation  He understands that the online visit is based solely on information provided by him, and that, in the absence of a face-to-face physical evaluation by the physician, the diagnosis he receives is both limited and provisional in terms of accuracy and completeness  This is not intended to replace a full medical face-to-face evaluation by the physician  [facility] understands and accepts these terms  Carbohydrate Counting Instruction    Met with Ubaldo for carbohydrate counting   [facility] is currently on the following insulin regimen: Lantus 22 units every evening and Admelog before meals, 6-12 units depending on blood sugar levels    Present at Session: patient     Patient Instructed on: Carbohydrate counting  Used [employer], Food labels, measuring cups, and other props to teach label reading, carbohydrate counting, food diary and measuring portions  Patient sent one day food diary exercise and will complete it after session  At this time, Coretta Muniz is unsure about his ability to demonstrate the math skills necessary for successful carbohydrate counting and following a flexible insulin regimen  Will allow him to work on the exercise over the next few days and email it when complete  Diabetes Education Record  Coretta Muniz received the following handouts: Portion Book, 3 day food logs, one day food log exercise      Patient response to instruction    Comprehensiongood  Motivationvery good  Expected Compliancegood    When food logs returned and reviewed, if appropriate, will determine flexible insulin regimen with referring provider  Will then have  call Ubaldo to schedule next visit  Thank you for referring your patient to [facility], it was a pleasure working with them today  Please feel free to call with any questions or concerns      Sofía Rosales  [phone]  [address] [facility] [phone]

## 2020-07-28 NOTE — PATIENT INSTRUCTIONS
First, complete the one day food log exercise that has Honey Nut Cheerios at the top  Send it back to me via email; [email or web address]  [email or web address]    Next, keep a 3 day food, BG, and insulin diary  Follow the instructions on the cover page  Be detailed  Pick any 3 days, they do not have to be 3 in a row  Finally, once you are done with 3 days of detailed records, email each page back to me  Then, we will contact you in a few days for the next step

## 2020-08-27 ENCOUNTER — TELEMEDICINE (OUTPATIENT)
Dept: ENDOCRINOLOGY | Facility: CLINIC | Age: 25
End: 2020-08-27
Payer: COMMERCIAL

## 2020-08-27 DIAGNOSIS — Z79.4 CURRENT USE OF INSULIN (HCC): ICD-10-CM

## 2020-08-27 DIAGNOSIS — E10.65 TYPE 1 DIABETES MELLITUS WITH HYPERGLYCEMIA (HCC): Primary | ICD-10-CM

## 2020-08-27 PROCEDURE — 99204 OFFICE O/P NEW MOD 45 MIN: CPT | Performed by: INTERNAL MEDICINE

## 2020-08-27 PROCEDURE — 3046F HEMOGLOBIN A1C LEVEL >9.0%: CPT | Performed by: INTERNAL MEDICINE

## 2020-08-27 PROCEDURE — 1036F TOBACCO NON-USER: CPT | Performed by: INTERNAL MEDICINE

## 2020-08-27 NOTE — PROGRESS NOTES
Virtual Regular Visit      Assessment/Plan:    Problem List Items Addressed This Visit        Endocrine    Type 1 diabetes mellitus ([address] ) - Primary    Relevant Medications    Glucagon 3 MG/DOSE POWD       Other    Current use of insulin (HCC)    Relevant Medications    Glucagon 3 MG/DOSE POWD               Reason for visit is   Chief Complaint   Patient presents with    Virtual Regular Visit        Encounter provider Tavia Abbott MD    Provider located at [id number] [place] [id number]      Recent Visits  No visits were found meeting these conditions  Showing recent visits within past 7 days and meeting all other requirements     Today's Visits  Date Type Provider Dept   08/27/20 Telemedicine Tavia Abbott MD [facility] [address] today's visits and meeting all other requirements     Future Appointments  No visits were found meeting these conditions  Showing future appointments within next 150 days and meeting all other requirements        The patient was identified by name and date of birth  BRADEN Nguyen was informed that this is a telemedicine visit and that the visit is being conducted through [employer]  My office door was closed  No one else was in the room  He acknowledged consent and understanding of privacy and security of the video platform  The patient has agreed to participate and understands they can discontinue the visit at any time  Patient is aware this is a billable service  Subjective  BRADEN Nguyen is a 31 y o  male with type 1 diabetes seen in consultation  HPI   He was diagnosed at age 8yrs and has only used insulin  He is interested in pump and Dexcom  He discussed the Omnipod and Tslim  He denies renal complications, and is unsure about eye  He does get pain in the feet  He denies MI or CVA   He has had DKA in the past, though more frequently when he was younger  The last episode of DKA was one year ago, but prior to that had not had DKA in "years "    His current regimen is: Lantus 20units once daily at 7-8pm and Admelog with meals  He is one a scale 6-12units  6units from 150-200, 10units 2--350, and 12units  He is not taking any insulin with normal blood sugars  He uses pens for these    He checks his BGs 3-4x daily:  Fastins  Lunch: 130-180  Dinner: 200-300  Bedtime: 130s, lowest 120s      Hypoglycemia: 69 and 70, typically in the morning  Symptoms: shaky, rapid heart rate, weakness  Treatment: OJ or glucose tabs    Eyes: scheduled  Pod: needs to see; does foot self, no hx of wounds  Dental: sees  Flu: does not usually get this    Diet: has seen nutrition and is working on a carb worksheet  He is not exercising  BF includes eggs and meat, or a Glucerna; lunch may be missed but can have 1-2 Glucerna shakes; dinner includes chicken and rice or pasta  Not exercising    No personal history of thyroid disease  ROS: has vision changes, but denies increased thirst or increased urination, and has pain in feet  Past Medical History:   Diagnosis Date    Diabetic keto-acidosis ([facility] [address] )        History reviewed  No pertinent surgical history      Current Outpatient Medications   Medication Sig Dispense Refill    ADMELOG SOLOSTAR 100 units/mL injection pen Admin insulin SQ TID with meals according to sliding scale 5 pen 2    Blood Glucose Monitoring Suppl (FREESTYLE FREEDOM LITE) w/Device KIT USE TO TEST GLUCOSE 3-4 X A DAY  0    cyclobenzaprine (FLEXERIL) 10 mg tablet Take 1 tablet (10 mg total) by mouth 3 (three) times a day as needed for muscle spasms 30 tablet 2    Glucagon 3 MG/DOSE POWD Use for hypoglycemia emergencies 1 each 1    glucose blood (FREESTYLE TEST STRIPS) test strip Use to test glucose 3-4 x a day 200 each 11    glucose monitoring kit (FREESTYLE) monitoring kit Use to test glucose 3-4 x a day 1 each 0    insulin glargine (LANTUS SOLOSTAR) 100 units/mL injection pen Inject 20 Units under the skin daily at bedtime 5 pen 5    Insulin Pen Needle (PEN NEEDLES 31GX5/16") 31G X 8 MM MISC by Does not apply route      Insulin Pen Needle (PEN NEEDLES) 32G X 4 MM MISC Use to admin insulin SQ 4 times a day 200 each 11    Lancets (FREESTYLE) lancets Use to test glucose 3-4 x a day 200 each 11    lisinopril (ZESTRIL) 2 5 mg tablet TAKE 1 TABLET BY MOUTH EVERY DAY 90 tablet 1    meloxicam (MOBIC) 15 mg tablet Take 1 tablet (15 mg total) by mouth daily With food 30 tablet 1     No current facility-administered medications for this visit  No Known Allergies    Review of Systems   Constitutional: Positive for fatigue and unexpected weight change (goes up and down)  HENT: Negative for hearing loss, trouble swallowing and voice change  Eyes: Positive for visual disturbance  Respiratory: Negative for cough and shortness of breath  Cardiovascular: Negative for chest pain and palpitations  Gastrointestinal: Positive for nausea  Negative for constipation and diarrhea  Appetite can vary, feels full at times   Endocrine: Negative for polydipsia and polyuria  Musculoskeletal: Positive for arthralgias and back pain  Skin: Negative for rash and wound  Neurological: Negative for numbness  Has pain and tingling in extremities   Psychiatric/Behavioral: Positive for dysphoric mood  All other systems reviewed and are negative  see also HPI    Video Exam    There were no vitals filed for this visit  Physical Exam     Physical Exam   Gen: appears well-developed and well-nourished  No apparent distress  Head: Normocephalic and atraumatic  Eyes: no stare or proptosis, no periorbital edema  E/N/M nl facies, hearing grossly intact  Neck: range of motion nl     Pulmonary/Chest: breathing  comfortably, no accessory muscle use, effort normal    Musculoskeletal: moves upper extremities  Neurological: alert and oriented to person, place, and time  No upper ext tremor appreciated  Skin: does not appear diaphoretic, no facial plethora  Psychiatric: normal mood and affect; behavior is normal; no gross lapses in memory, answer questions appropriately      DATA    Lab Results   Component Value Date    HGBA1C 11 4 (A) 07/13/2020       Lab Results   Component Value Date    OVL2DKRKDPAI 1 260 11/20/2019     Lab Results   Component Value Date     03/27/2014    SODIUM 141 12/03/2019    K 3 9 12/03/2019     12/03/2019    CO2 29 12/03/2019    ANIONGAP 11 03/27/2014    AGAP 5 12/03/2019    BUN 16 12/03/2019    CREATININE 0 70 12/03/2019    GLUC 185 (H) 12/03/2019    GLUF 113 (H) 11/20/2019    CALCIUM 9 9 12/03/2019    AST 11 12/03/2019    ALT 16 12/03/2019    ALKPHOS 64 12/03/2019    TP 7 7 12/03/2019    TBILI 0 70 12/03/2019    EGFR 131 12/03/2019       A/p  25yom with T1DM, hx of DKA and hypoglycemia  1  Type 1 diabetes, uncontrolled with hypo and hyperglycemia: At this time, he is seeing DM education and nutrition for carb counting  He is not taking any meal insulin for a normal BG and a carb containing meal  I recommend starting Admelog 2 units with car containing meals (such as rice or Glucerna)  I also recommend lowering Lantus to 18units  We discussed sensor and pump, and I think he would benbefit from both  He may want a trial of the Dexcom professional to see what this is like  I spent 40 minutes with patient today in which greater than 50% of the time was spent in counseling/coordination of care regarding type 1 diabetes and technologies      VIRTUAL VISIT [address] acknowledges that he has consented to an online visit or consultation   He understands that the online visit is based solely on information provided by him, and that, in the absence of a face-to-face physical evaluation by the physician, the diagnosis he receives is both limited and provisional in terms of accuracy and completeness  This is not intended to replace a full medical face-to-face evaluation by the physician  [facility] understands and accepts these terms

## 2020-08-27 NOTE — PATIENT INSTRUCTIONS
Please use Adnelog 2 units with lunch and dinner for normal bgs ()  You can still use the scale for now    I think starting with a sensor would   Lower the Lantus to 18units once daily

## 2020-11-15 ENCOUNTER — HOSPITAL ENCOUNTER (EMERGENCY)
Facility: HOSPITAL | Age: 25
Discharge: HOME | End: 2020-11-15
Attending: EMERGENCY MEDICINE
Payer: COMMERCIAL

## 2020-11-15 ENCOUNTER — APPOINTMENT (EMERGENCY)
Dept: RADIOLOGY | Facility: HOSPITAL | Age: 25
End: 2020-11-15
Payer: COMMERCIAL

## 2020-11-15 VITALS
TEMPERATURE: 98 F | RESPIRATION RATE: 16 BRPM | HEART RATE: 101 BPM | OXYGEN SATURATION: 100 % | BODY MASS INDEX: 16.64 KG/M2 | SYSTOLIC BLOOD PRESSURE: 116 MMHG | DIASTOLIC BLOOD PRESSURE: 64 MMHG | WEIGHT: 116 LBS

## 2020-11-15 DIAGNOSIS — M54.50 LOW BACK PAIN: Primary | ICD-10-CM

## 2020-11-15 LAB — GLUCOSE SERPL-MCNC: 192 MG/DL (ref 65–140)

## 2020-11-15 PROCEDURE — 96372 THER/PROPH/DIAG INJ SC/IM: CPT

## 2020-11-15 PROCEDURE — 99283 EMERGENCY DEPT VISIT LOW MDM: CPT

## 2020-11-15 PROCEDURE — 82948 REAGENT STRIP/BLOOD GLUCOSE: CPT

## 2020-11-15 PROCEDURE — 72100 X-RAY EXAM L-S SPINE 2/3 VWS: CPT

## 2020-11-15 PROCEDURE — 99284 EMERGENCY DEPT VISIT MOD MDM: CPT | Performed by: EMERGENCY MEDICINE

## 2020-11-15 RX ORDER — ACETAMINOPHEN 325 MG/1
975 TABLET ORAL ONCE
Status: COMPLETED | OUTPATIENT
Start: 2020-11-15 | End: 2020-11-15

## 2020-11-15 RX ORDER — IBUPROFEN 800 MG/1
800 TABLET ORAL EVERY 8 HOURS PRN
Qty: 21 TABLET | Refills: 0 | Status: SHIPPED | OUTPATIENT
Start: 2020-11-15

## 2020-11-15 RX ORDER — KETOROLAC TROMETHAMINE 30 MG/ML
15 INJECTION, SOLUTION INTRAMUSCULAR; INTRAVENOUS ONCE
Status: COMPLETED | OUTPATIENT
Start: 2020-11-15 | End: 2020-11-15

## 2020-11-15 RX ORDER — CYCLOBENZAPRINE HCL 10 MG
10 TABLET ORAL ONCE
Status: COMPLETED | OUTPATIENT
Start: 2020-11-15 | End: 2020-11-15

## 2020-11-15 RX ORDER — LIDOCAINE 50 MG/G
2 PATCH TOPICAL ONCE
Status: DISCONTINUED | OUTPATIENT
Start: 2020-11-15 | End: 2020-11-15 | Stop reason: HOSPADM

## 2020-11-15 RX ORDER — CYCLOBENZAPRINE HCL 10 MG
10 TABLET ORAL 2 TIMES DAILY PRN
Qty: 20 TABLET | Refills: 0 | Status: SHIPPED | OUTPATIENT
Start: 2020-11-15

## 2020-11-15 RX ORDER — ACETAMINOPHEN 500 MG
500 TABLET ORAL EVERY 6 HOURS PRN
Qty: 30 TABLET | Refills: 0 | Status: SHIPPED | OUTPATIENT
Start: 2020-11-15

## 2020-11-15 RX ADMIN — ACETAMINOPHEN 975 MG: 325 TABLET, FILM COATED ORAL at 09:06

## 2020-11-15 RX ADMIN — KETOROLAC TROMETHAMINE 15 MG: 30 INJECTION, SOLUTION INTRAMUSCULAR at 09:08

## 2020-11-15 RX ADMIN — CYCLOBENZAPRINE HYDROCHLORIDE 10 MG: 10 TABLET, FILM COATED ORAL at 09:06

## 2020-11-15 RX ADMIN — LIDOCAINE 5% 2 PATCH: 700 PATCH TOPICAL at 09:06

## 2020-11-24 DIAGNOSIS — E10.9 TYPE 1 DIABETES MELLITUS WITHOUT COMPLICATION (HCC): ICD-10-CM

## 2020-11-24 RX ORDER — INSULIN GLARGINE 100 [IU]/ML
18 INJECTION, SOLUTION SUBCUTANEOUS
Qty: 5 PEN | Refills: 4 | Status: SHIPPED | OUTPATIENT
Start: 2020-11-24

## 2020-12-02 ENCOUNTER — TELEMEDICINE (OUTPATIENT)
Dept: MULTI SPECIALTY CLINIC | Facility: CLINIC | Age: 25
End: 2020-12-02

## 2020-12-02 DIAGNOSIS — E10.65 TYPE 1 DIABETES MELLITUS WITH HYPERGLYCEMIA (HCC): ICD-10-CM

## 2020-12-02 DIAGNOSIS — Z79.4 CURRENT USE OF INSULIN (HCC): ICD-10-CM

## 2020-12-02 DIAGNOSIS — E10.649 TYPE 1 DIABETES MELLITUS WITH HYPOGLYCEMIA AND WITHOUT COMA (HCC): Primary | ICD-10-CM

## 2020-12-02 DIAGNOSIS — E10.9 TYPE 1 DIABETES MELLITUS WITHOUT COMPLICATION (HCC): ICD-10-CM

## 2020-12-02 PROCEDURE — 99215 OFFICE O/P EST HI 40 MIN: CPT | Performed by: INTERNAL MEDICINE

## 2020-12-02 PROCEDURE — 1036F TOBACCO NON-USER: CPT | Performed by: INTERNAL MEDICINE

## 2021-02-16 DIAGNOSIS — M54.50 CHRONIC BILATERAL LOW BACK PAIN WITHOUT SCIATICA: ICD-10-CM

## 2021-02-16 DIAGNOSIS — G89.29 CHRONIC BILATERAL LOW BACK PAIN WITHOUT SCIATICA: ICD-10-CM

## 2021-02-16 DIAGNOSIS — E10.9 TYPE 1 DIABETES MELLITUS WITHOUT COMPLICATION (HCC): ICD-10-CM

## 2021-02-16 RX ORDER — PEN NEEDLE, DIABETIC 30 GX3/16"
NEEDLE, DISPOSABLE MISCELLANEOUS
Qty: 200 EACH | Refills: 11 | Status: SHIPPED | OUTPATIENT
Start: 2021-02-16

## 2021-02-16 RX ORDER — CYCLOBENZAPRINE HCL 10 MG
10 TABLET ORAL 3 TIMES DAILY PRN
Qty: 30 TABLET | Refills: 2 | Status: SHIPPED | OUTPATIENT
Start: 2021-02-16

## 2021-02-16 NOTE — TELEPHONE ENCOUNTER
Patient needs refills I soonest possible  I remind the patient to go for blood work  Patient is going to call back to schedule f/u

## 2021-03-31 LAB
LEFT EYE DIABETIC RETINOPATHY: NORMAL
RIGHT EYE DIABETIC RETINOPATHY: NORMAL

## 2021-04-01 ENCOUNTER — TELEMEDICINE (OUTPATIENT)
Dept: INTERNAL MEDICINE CLINIC | Facility: CLINIC | Age: 26
End: 2021-04-01

## 2021-04-01 DIAGNOSIS — Z20.822 CONTACT WITH AND (SUSPECTED) EXPOSURE TO COVID-19: Primary | ICD-10-CM

## 2021-04-01 DIAGNOSIS — R11.11 NON-INTRACTABLE VOMITING WITHOUT NAUSEA, UNSPECIFIED VOMITING TYPE: ICD-10-CM

## 2021-04-01 DIAGNOSIS — Z20.822 CONTACT WITH AND (SUSPECTED) EXPOSURE TO COVID-19: ICD-10-CM

## 2021-04-01 DIAGNOSIS — R05.9 COUGH: ICD-10-CM

## 2021-04-01 PROCEDURE — 99213 OFFICE O/P EST LOW 20 MIN: CPT | Performed by: INTERNAL MEDICINE

## 2021-04-01 PROCEDURE — U0003 INFECTIOUS AGENT DETECTION BY NUCLEIC ACID (DNA OR RNA); SEVERE ACUTE RESPIRATORY SYNDROME CORONAVIRUS 2 (SARS-COV-2) (CORONAVIRUS DISEASE [COVID-19]), AMPLIFIED PROBE TECHNIQUE, MAKING USE OF HIGH THROUGHPUT TECHNOLOGIES AS DESCRIBED BY CMS-2020-01-R: HCPCS | Performed by: STUDENT IN AN ORGANIZED HEALTH CARE EDUCATION/TRAINING PROGRAM

## 2021-04-01 PROCEDURE — U0005 INFEC AGEN DETEC AMPLI PROBE: HCPCS | Performed by: STUDENT IN AN ORGANIZED HEALTH CARE EDUCATION/TRAINING PROGRAM

## 2021-04-01 RX ORDER — GUAIFENESIN/DEXTROMETHORPHAN 100-10MG/5
5 SYRUP ORAL 3 TIMES DAILY PRN
Qty: 236 ML | Refills: 0 | Status: SHIPPED | OUTPATIENT
Start: 2021-04-01

## 2021-04-01 NOTE — PROGRESS NOTES
Virtual Regular Visit      Assessment/Plan:    Problem List Items Addressed This Visit     None      Visit Diagnoses     Contact with and (suspected) exposure to covid-19    -  Primary    Relevant Medications    dextromethorphan-guaiFENesin (ROBITUSSIN DM)  mg/5 mL syrup    Other Relevant Orders    Novel Coronavirus (COVID-19), PCR [facility] Collected at   [facility] or [facility]    Cough        Relevant Medications    dextromethorphan-guaiFENesin (ROBITUSSIN DM)  mg/5 mL syrup    Non-intractable vomiting without nausea, unspecified vomiting type                   Reason for visit is   Chief Complaint   Patient presents with    Vomiting     COVID exposure- yesterday  Symptoms started last night     Diarrhea    Chills    Nasal Congestion    Headache    Virtual Regular Visit        Encounter provider Sabina Pittman DO    Provider located at [address] [place] [address] Road  [address] [place] [address] [phone]      Recent Visits  No visits were found meeting these conditions  Showing recent visits within past 7 days and meeting all other requirements     Today's Visits  Date Type Provider Dept   04/01/21 Telemedicine El[facility] [address] [facility] today's visits and meeting all other requirements     Future Appointments  No visits were found meeting these conditions  Showing future appointments within next 150 days and meeting all other requirements        The patient was identified by name and date of birth  BRADEN Gibbs 74 was informed that this is a telemedicine visit and that the visit is being conducted through [facility] and patient was informed that this is a secure, HIPAA-compliant platform  He agrees to proceed     My office door was closed  No one else was in the room  He acknowledged consent and understanding of privacy and security of the video platform   The patient has agreed to participate and understands they can discontinue the visit at any time  Patient is aware this is a billable service  Subjective  R Bipin Gibbs [address] is a 31 y o  male Past medical history of type 1 diabetes presents with  Symptoms concerning of COVID 19 infection  Patient notes exposure to known COVID 19 patient yesterday March 31st  Morning,  And later same day he started to have symptoms,  Dry cough,  Vomited twice - nonbloody non biliary -  Had loose bowel movement yesterday,  Continued to have mild sore throat and dysgeusia,  Bilateral chest pain noted in context of frequent dry cough which started yesterday,  Denies sharp chest pain denies radiation of the pain to the left shoulder the room  Denies fever subjectively,  Notes thermometer at home  patient notes that his GI symptoms  Improved today,  No vomiting today very mild if any nausea denies abdominal pain started and encouraged to eat,  And advised on drinking Pedialyte / drink as was potassium chloride if not tolerating much food,  And if not able to eat and drink enough patient may need to go to the hospital for further management  Patient notes that his blood  Glucose level today was in the 70s,  And he is advised to measure his blood glucose level more frequently these days and try to eat and drink  Adequately  COVID-19 PCR lab order was sent and information about testing center provided per patient,  Patient is to follow-up was virtual visit on Monday,  And advised on going to the hospital if experiencing worsening shortness of breath or refractory fever  Advised on using Tylenol 650 mg q 6 hours p r n  for fever and pain as well as Robitussin DM sent to be used at nighttime for the frequent dry cough  patient also notes that he lives with his girlfriend and mother both of them were exposed to the same person and ER sick as well and both of them seek medical care ready and will be tested    Information about the need for self isolation till we get the results and till further evaluation according to the CDC guidelines which explained to the patient,  For patient was offered a letter for work and he said he does not need it  HPI     Past Medical History:   Diagnosis Date    Diabetes mellitus ([facility] )     Diabetic keto-acidosis ([facility] )     Type 1 diabetes ([facility] )        History reviewed  No pertinent surgical history      Current Outpatient Medications   Medication Sig Dispense Refill    acetaminophen (TYLENOL) 500 mg tablet Take 1 tablet (500 mg total) by mouth every 6 (six) hours as needed for mild pain 30 tablet 0    ADMELOG SOLOSTAR 100 units/mL injection pen Admin insulin SQ TID with meals according to sliding scale 5 pen 2    cyclobenzaprine (FLEXERIL) 10 mg tablet Take 1 tablet (10 mg total) by mouth 2 (two) times a day as needed for muscle spasms 20 tablet 0    Glucagon 3 MG/DOSE POWD Use for hypoglycemia emergencies 1 each 1    insulin glargine (Lantus SoloStar) 100 units/mL injection pen Inject 18 Units under the skin daily at bedtime 5 pen 4    Insulin Pen Needle (Pen Needles) 32G X 4 MM MISC Use to admin insulin SQ 4 times a day 200 each 11    Lancets (FREESTYLE) lancets Use to test glucose 3-4 x a day 200 each 11    lisinopril (ZESTRIL) 2 5 mg tablet TAKE 1 TABLET BY MOUTH EVERY DAY 90 tablet 1    cyclobenzaprine (FLEXERIL) 10 mg tablet Take 1 tablet (10 mg total) by mouth 3 (three) times a day as needed for muscle spasms (Patient not taking: Reported on 4/1/2021) 30 tablet 2    dextromethorphan-guaiFENesin (ROBITUSSIN DM)  mg/5 mL syrup Take 5 mL by mouth 3 (three) times a day as needed for cough 236 mL 0    Glucagon 3 MG/DOSE POWD Use for hypoglycemic emergency (Patient not taking: Reported on 4/1/2021) 1 each 2    ibuprofen (MOTRIN) 800 mg tablet Take 1 tablet (800 mg total) by mouth every 8 (eight) hours as needed for mild pain (Patient not taking: Reported on 4/1/2021) 21 tablet 0     No current facility-administered medications for this visit  No Known Allergies    Review of Systems   pertinent  Symptoms positive and negatives noted in the HPI above    Video Exam    Vitals:       Physical Exam    general:  Ill-appearing,  Periorbital darkening skin   respiratory:  Dry cough noted during exam,  Patient is able to complete complete sentences without noted CAMACHO   HEENT:  No rhinorrhea or conjunctivitis noted on exam during visit    I spent 25 minutes directly with the patient during this visit      [address] [facility] acknowledges that he has consented to an online visit or consultation  He understands that the online visit is based solely on information provided by him, and that, in the absence of a face-to-face physical evaluation by the physician, the diagnosis he receives is both limited and provisional in terms of accuracy and completeness  This is not intended to replace a full medical face-to-face evaluation by the physician  [facility] understands and accepts these terms

## 2021-04-02 LAB — SARS-COV-2 RNA RESP QL NAA+PROBE: NEGATIVE

## 2021-04-28 LAB
LEFT EYE DIABETIC RETINOPATHY: NORMAL
RIGHT EYE DIABETIC RETINOPATHY: NORMAL

## 2021-05-05 ENCOUNTER — TELEPHONE (OUTPATIENT)
Dept: INTERNAL MEDICINE CLINIC | Facility: CLINIC | Age: 26
End: 2021-05-05

## 2021-05-05 ENCOUNTER — TELEMEDICINE (OUTPATIENT)
Dept: INTERNAL MEDICINE CLINIC | Facility: CLINIC | Age: 26
End: 2021-05-05

## 2021-05-05 DIAGNOSIS — K59.00 CONSTIPATION, UNSPECIFIED CONSTIPATION TYPE: ICD-10-CM

## 2021-05-05 DIAGNOSIS — R12 HEARTBURN: ICD-10-CM

## 2021-05-05 DIAGNOSIS — R68.81 EARLY SATIETY: ICD-10-CM

## 2021-05-05 DIAGNOSIS — E10.65 TYPE 1 DIABETES MELLITUS WITH HYPERGLYCEMIA (HCC): ICD-10-CM

## 2021-05-05 DIAGNOSIS — R11.2 NAUSEA AND VOMITING, INTRACTABILITY OF VOMITING NOT SPECIFIED, UNSPECIFIED VOMITING TYPE: Primary | ICD-10-CM

## 2021-05-05 DIAGNOSIS — R10.13 EPIGASTRIC ABDOMINAL PAIN: ICD-10-CM

## 2021-05-05 DIAGNOSIS — R10.30 LOWER ABDOMINAL PAIN: ICD-10-CM

## 2021-05-05 PROCEDURE — 1036F TOBACCO NON-USER: CPT | Performed by: PHYSICIAN ASSISTANT

## 2021-05-05 PROCEDURE — 99214 OFFICE O/P EST MOD 30 MIN: CPT | Performed by: PHYSICIAN ASSISTANT

## 2021-05-05 RX ORDER — OMEPRAZOLE 40 MG/1
40 CAPSULE, DELAYED RELEASE ORAL DAILY
Qty: 30 CAPSULE | Refills: 3 | Status: SHIPPED | OUTPATIENT
Start: 2021-05-05

## 2021-05-05 RX ORDER — ONDANSETRON 8 MG/1
8 TABLET, ORALLY DISINTEGRATING ORAL EVERY 8 HOURS PRN
Qty: 30 TABLET | Refills: 1 | Status: SHIPPED | OUTPATIENT
Start: 2021-05-05

## 2021-05-05 NOTE — TELEPHONE ENCOUNTER
Staff, please call pt to set up 2 week f/u for GI sxs and other concerns  Also, after I spoke with patient, I notice that he never got blood work done that was ordered by endocrinology in December  He should have this done prior to his next follow-up with them

## 2021-05-05 NOTE — PROGRESS NOTES
Virtual Regular Visit      Assessment/Plan:    Problem List Items Addressed This Visit        Endocrine    Type 1 diabetes mellitus with hyperglycemia ([facility] [address] )    Relevant Orders    Ambulatory referral to Gastroenterology      Other Visit Diagnoses     Nausea and vomiting, intractability of vomiting not specified, unspecified vomiting type    -  Primary    Relevant Medications    ondansetron (ZOFRAN-ODT) 8 mg disintegrating tablet    Other Relevant Orders    H  pylori antigen, stool    Ambulatory referral to Gastroenterology    Constipation, unspecified constipation type        Relevant Orders    Ambulatory referral to Gastroenterology    Lower abdominal pain        Relevant Orders    Ambulatory referral to Gastroenterology    Epigastric abdominal pain        Relevant Orders    H  pylori antigen, stool    Ambulatory referral to Gastroenterology    Early satiety        Relevant Orders    H  pylori antigen, stool    Ambulatory referral to Gastroenterology    Heartburn        Relevant Medications    omeprazole (PriLOSEC) 40 MG capsule    Other Relevant Orders    H  pylori antigen, stool    Ambulatory referral to Gastroenterology        58-year-old male with pre-existing uncontrolled diabetes, presenting for virtual visit today to discuss ongoing GI issues as described in HPI  Patient has a multitude of symptoms, both upper and lower GI symptoms  I do question constipation as a cause to some of his issues, and I am also concerned that he may have gastroparesis based on his uncontrolled diabetes  I am Also worried that slow motility in his intestines may be results of the uncontrolled diabetes and it affecting his nerves in his GI tract  At this time I strongly believe he needs GI input and may need colonoscopy/EGD  We will see if we can attempt to get a stool sample for H pylori testing prior however patient unsure if he will be able to do so with his constipation issues    I told him to try to hold off to start PPI, omeprazole 40 mg daily, until he is able to give a stool sample if possible  Zofran also prescribed for nausea and vomiting relief  And again he was encouraged to schedule appointment with GI ASAP and was given referral information  Patient also noncompliant with getting blood work done ordered in December by endocrinology and was due for follow-up in March but never scheduled  Patient given contact information for Endocrine in [facility] and was encouraged to contact them today or tomorrow to set up an appointment as well  Also advised to get fasting blood work done prior to next appointment  I will have staff schedule him for 2 week follow-up with me for compliance check  Pt agrees with plan  Reason for visit is   Chief Complaint   Patient presents with    Abdominal Pain     feels full after a few bites    Constipation    Nausea    Virtual Regular Visit        Encounter provider Jana Maria PA-C    Provider located at [facility] [address] [address] [facility] [address] [phone]      Recent Visits  No visits were found meeting these conditions  Showing recent visits within past 7 days and meeting all other requirements     Today's Visits  Date Type Provider Dept   05/05/21 Telephone Jana Maria, [address] [facility]   05/05/21 [address] [facility], [address] [place] [address] today's visits and meeting all other requirements     Future Appointments  No visits were found meeting these conditions  Showing future appointments within next 150 days and meeting all other requirements        The patient was identified by name and date of birth  BRADEN Gibbs 74 was informed that this is a telemedicine visit and that the visit is being conducted through [facility] and patient was informed that this is a secure, HIPAA-compliant platform  He agrees to proceed     My office door was closed  No one else was in the room  He acknowledged consent and understanding of privacy and security of the video platform  The patient has agreed to participate and understands they can discontinue the visit at any time  Patient is aware this is a billable service  Subjective  R Bipin Gibbs 74 is a 31 y o  male presenting today for ongoing GI sxs getting worse   24y/o male here today for virtual visit to discuss ongoing GI sxs, thinks worsens over past 6 months  He has had some issues with loss of appetite, however, now pt states it is difficult to manage his diabetes, balancing eating with use of insulin considering his GI sxs are holding him back and causing a lot of issues    He has been trying to eat better overall  Getting full quicker, intermittent nausea which is sometimes in AM waking with it, sometimes nausea after eating, constipation ( infrequent BM, sometimes going every 1-2 weeks)  Laxatives dont really work  Also vomiting intermittent, usually when he cannot pass BM  Also gets lower abdominal discomfort, gets hard and uncomfortable  Pain does get better after BM  He does note getting some epigastric pain, sometimes entire abdomen  He does get problems with burping, acid, heartburn  He takes antacid tabs PRN which helps subside for a short term then sxs return          Past Medical History:   Diagnosis Date    Diabetes mellitus ([place] [address] )     Diabetic keto-acidosis ([place] [address] )     Type 1 diabetes ([place] [address] )        Past Surgical History:   Procedure Laterality Date    EYE SURGERY      injections in eyes       Current Outpatient Medications   Medication Sig Dispense Refill    acetaminophen (TYLENOL) 500 mg tablet Take 1 tablet (500 mg total) by mouth every 6 (six) hours as needed for mild pain 30 tablet 0    ADMELOG SOLOSTAR 100 units/mL injection pen Admin insulin SQ TID with meals according to sliding scale 5 pen 2    cyclobenzaprine (FLEXERIL) 10 mg tablet Take 1 tablet (10 mg total) by mouth 2 (two) times a day as needed for muscle spasms 20 tablet 0    Glucagon 3 MG/DOSE POWD Use for hypoglycemia emergencies 1 each 1    insulin glargine (Lantus SoloStar) 100 units/mL injection pen Inject 18 Units under the skin daily at bedtime 5 pen 4    Insulin Pen Needle (Pen Needles) 32G X 4 MM MISC Use to admin insulin SQ 4 times a day 200 each 11    Lancets (FREESTYLE) lancets Use to test glucose 3-4 x a day 200 each 11    lisinopril (ZESTRIL) 2 5 mg tablet TAKE 1 TABLET BY MOUTH EVERY DAY 90 tablet 1    cyclobenzaprine (FLEXERIL) 10 mg tablet Take 1 tablet (10 mg total) by mouth 3 (three) times a day as needed for muscle spasms (Patient not taking: Reported on 4/1/2021) 30 tablet 2    dextromethorphan-guaiFENesin (ROBITUSSIN DM)  mg/5 mL syrup Take 5 mL by mouth 3 (three) times a day as needed for cough (Patient not taking: Reported on 5/5/2021) 236 mL 0    Glucagon 3 MG/DOSE POWD Use for hypoglycemic emergency (Patient not taking: Reported on 4/1/2021) 1 each 2    ibuprofen (MOTRIN) 800 mg tablet Take 1 tablet (800 mg total) by mouth every 8 (eight) hours as needed for mild pain (Patient not taking: Reported on 4/1/2021) 21 tablet 0    omeprazole (PriLOSEC) 40 MG capsule Take 1 capsule (40 mg total) by mouth daily 30 capsule 3    ondansetron (ZOFRAN-ODT) 8 mg disintegrating tablet Take 1 tablet (8 mg total) by mouth every 8 (eight) hours as needed for nausea or vomiting 30 tablet 1     No current facility-administered medications for this visit  No Known Allergies    Review of Systems   Constitutional: Negative  Respiratory: Negative  Cardiovascular: Negative  Gastrointestinal:        As in HPI   Genitourinary: Negative  Musculoskeletal: Negative  Skin: Negative  Neurological: Negative  Psychiatric/Behavioral: Negative  Video Exam    Vitals:       Physical Exam  Constitutional:       General: He is not in acute distress  Appearance: Normal appearance  He is not ill-appearing or toxic-appearing  HENT:      Head: Normocephalic and atraumatic  Pulmonary:      Effort: Pulmonary effort is normal    Abdominal:      Comments: Unable to examine  Abdomen does not appear generally distended   Neurological:      Mental Status: He is alert and oriented to person, place, and time  Psychiatric:         Mood and Affect: Mood normal          Behavior: Behavior normal  Behavior is cooperative  I spent 16 minutes directly with the patient during this visit      [facility] acknowledges that he has consented to an online visit or consultation  He understands that the online visit is based solely on information provided by him, and that, in the absence of a face-to-face physical evaluation by the physician, the diagnosis he receives is both limited and provisional in terms of accuracy and completeness  This is not intended to replace a full medical face-to-face evaluation by the physician  [facility] understands and accepts these terms

## 2021-05-06 NOTE — TELEPHONE ENCOUNTER
Patient is scheduled on 5/20 for f/u and understood he needs to have blood work prior to his endo appt

## 2021-05-08 DIAGNOSIS — E10.9 TYPE 1 DIABETES MELLITUS WITHOUT COMPLICATION (HCC): ICD-10-CM

## 2021-05-10 PROCEDURE — 4010F ACE/ARB THERAPY RXD/TAKEN: CPT | Performed by: PHYSICIAN ASSISTANT

## 2021-05-10 RX ORDER — LISINOPRIL 2.5 MG/1
TABLET ORAL
Qty: 90 TABLET | Refills: 1 | Status: SHIPPED | OUTPATIENT
Start: 2021-05-10

## 2021-05-30 DIAGNOSIS — R12 HEARTBURN: ICD-10-CM

## 2021-06-01 RX ORDER — OMEPRAZOLE 40 MG/1
CAPSULE, DELAYED RELEASE ORAL
Qty: 90 CAPSULE | Refills: 1 | OUTPATIENT
Start: 2021-06-01

## 2021-06-01 NOTE — TELEPHONE ENCOUNTER
Please call patient  I received an automatic refill request from his pharmacy for the omeprazole that I sent in at his last visit for GI symptoms  I do not see that he ever got the H pylori testing done and I advised that he not start this medicine until after he get the testing done  Therefore he should not need any omeprazole as of yet  Please remind patient to get testing done especially if he is still having symptoms    Also, if he started the omeprazole without getting the testing done, he cannot get the test until he is off of the omeprazole for 2 weeks as it can affect the results

## 2021-06-07 NOTE — TELEPHONE ENCOUNTER
lmom for patient to call back, have been unable to reach patient, letter being sent to patient to call our office

## 2024-07-26 NOTE — ED PROVIDER NOTES
History  Chief Complaint   Patient presents with    Nausea     Patient states that this has been going on for the last month and a half, EMS Blood sugar of 180; states that he just has been feeling sick for a while now; last threw up on Friday      Patient is a 20 yo M w/ a history of T1DM who presents for vomiting  Patient says that he has been having episodes of vomiting and nausea for the past 1 5 months  He says that it will last about a day, get better for 2-3 days then return  He says that he has not vomiting today, but did vomit on Friday  Patient says that he came in for evaluation today because of the nausea  Patient's GF says that his vomit is usually dark brown in color  He denies any abdominal pain, loose stools, bloody stools, headache, lightheadedness, dizziness, sob, urinary symotms          Prior to Admission Medications   Prescriptions Last Dose Informant Patient Reported? Taking?    ADMELOG SOLOSTAR 100 units/mL injection pen   No Yes   Sig: Admin insulin SQ TID with meals according to sliding scale   Insulin Pen Needle (PEN NEEDLES 31GX5/16") 31G X 8 MM MISC   Yes No   Sig: by Does not apply route   Insulin Pen Needle (PEN NEEDLES) 32G X 4 MM MISC   No Yes   Sig: Use to admin insulin SQ 4 times a day   Lancets (FREESTYLE) lancets   No Yes   Sig: Use to test glucose 3-4 x a day   cyclobenzaprine (FLEXERIL) 10 mg tablet   No Yes   Sig: Take 1 tablet (10 mg total) by mouth 3 (three) times a day as needed for muscle spasms   glucose blood (FREESTYLE TEST STRIPS) test strip   No Yes   Sig: Use to test glucose 3-4 x a day   glucose monitoring kit (FREESTYLE) monitoring kit   No Yes   Sig: Use to test glucose 3-4 x a day   insulin aspart (NOVOLOG FLEXPEN) 100 Units/mL injection pen   No Yes   Sig: Admin insulin SQ TID with meals according to sliding scale   insulin glargine (LANTUS SOLOSTAR) 100 units/mL injection pen   No Yes   Sig: Inject 20 Units under the skin daily at bedtime   lisinopril Low salt low fat low cholestrol   (ZESTRIL) 2 5 mg tablet   No Yes   Sig: Take 1 tablet (2 5 mg total) by mouth daily   meloxicam (MOBIC) 15 mg tablet   No Yes   Sig: Take 1 tablet (15 mg total) by mouth daily With food      Facility-Administered Medications: None       Past Medical History:   Diagnosis Date    Diabetes mellitus ([facility] [address] )        History reviewed  No pertinent surgical history  Family History   Problem Relation Age of Onset    No Known Problems Mother     No Known Problems Father     Diabetes Maternal Grandmother     Diabetes Paternal Grandmother      I have reviewed and agree with the history as documented  Social History     Tobacco Use    Smoking status: Never Smoker    Smokeless tobacco: Never Used   Substance Use Topics    Alcohol use: No    Drug use: No        Review of Systems   Constitutional: Negative for chills, fever and unexpected weight change  HENT: Negative for congestion, sore throat and trouble swallowing  Eyes: Negative for pain, discharge and itching  Respiratory: Negative for cough, chest tightness, shortness of breath and wheezing  Cardiovascular: Negative for chest pain, palpitations and leg swelling  Gastrointestinal: Positive for nausea and vomiting  Negative for abdominal pain, blood in stool and diarrhea  Endocrine: Negative for polyuria  Genitourinary: Negative for difficulty urinating, dysuria, frequency and hematuria  Musculoskeletal: Negative for arthralgias and back pain  Neurological: Negative for dizziness, syncope, weakness, light-headedness and headaches         Physical Exam  ED Triage Vitals   Temperature Pulse Respirations Blood Pressure SpO2   12/03/19 1932 12/03/19 1932 12/03/19 1932 12/03/19 1932 12/03/19 1932   98 1 °F (36 7 °C) (!) 109 19 109/76 99 %      Temp Source Heart Rate Source Patient Position - Orthostatic VS BP Location FiO2 (%)   12/03/19 1932 12/03/19 1932 12/03/19 1932 12/03/19 1932 --   Oral Monitor Sitting Left arm       Pain Score       12/03/19 2148       No Pain             Orthostatic Vital Signs  Vitals:    12/03/19 1932 12/03/19 2148 12/04/19 0026   BP: 109/76 105/58 115/76   Pulse: (!) 109 (!) 107 96   Patient Position - Orthostatic VS: Sitting Lying Lying       Physical Exam   Constitutional: He is oriented to person, place, and time  He appears well-developed and well-nourished  No distress  HENT:   Head: Normocephalic and atraumatic  Right Ear: External ear normal    Left Ear: External ear normal    Eyes: Pupils are equal, round, and reactive to light  Conjunctivae and EOM are normal    Neck: Normal range of motion  Cardiovascular: Normal rate, regular rhythm, normal heart sounds and intact distal pulses  Exam reveals no gallop and no friction rub  No murmur heard  Pulmonary/Chest: Effort normal and breath sounds normal  No respiratory distress  He has no wheezes  He has no rales  Abdominal: Soft  Bowel sounds are normal  He exhibits no distension  There is no tenderness  There is no guarding  Musculoskeletal: Normal range of motion  He exhibits no edema, tenderness or deformity  Lymphadenopathy:     He has no cervical adenopathy  Neurological: He is alert and oriented to person, place, and time  No cranial nerve deficit or sensory deficit  He exhibits normal muscle tone  Skin: Skin is warm and dry  Psychiatric: He has a normal mood and affect  His behavior is normal    Nursing note and vitals reviewed        ED Medications  Medications   sodium chloride 0 9 % bolus 1,000 mL (0 mL Intravenous Stopped 12/3/19 2157)   ondansetron (ZOFRAN) injection 4 mg (4 mg Intravenous Given 12/3/19 2100)   sodium chloride 0 9 % bolus 1,000 mL (0 mL Intravenous Stopped 12/4/19 0023)   iohexol (OMNIPAQUE) 350 MG/ML injection (MULTI-DOSE) 100 mL (100 mL Intravenous Given 12/3/19 2336)   ondansetron (ZOFRAN) injection 4 mg (4 mg Intravenous Given 12/3/19 2344)       Diagnostic Studies  Results Reviewed     Procedure Component Value Units Date/Time Urine Microscopic [[id number]]  (Abnormal) Collected:  12/03/19 2054    Lab Status:  Final result Specimen:  Urine, Other Updated:  12/03/19 2223     RBC, UA 4-10 /hpf      WBC, UA 4-10 /hpf      Epithelial Cells Occasional /hpf      Bacteria, UA Occasional /hpf      MUCUS THREADS Innumerable    Beta Hydroxybutyrate [[id number]]  (Normal) Collected:  12/03/19 2101    Lab Status:  Final result Specimen:  Blood from Arm, Right Updated:  12/03/19 2133     BETA-HYDROXYBUTYRATE 0 3 mmol/L     Comprehensive metabolic panel [[id number]]  (Abnormal) Collected:  12/03/19 2101    Lab Status:  Final result Specimen:  Blood from Arm, Right Updated:  12/03/19 2133     Sodium 141 mmol/L      Potassium 3 9 mmol/L      Chloride 107 mmol/L      CO2 29 mmol/L      ANION GAP 5 mmol/L      BUN 16 mg/dL      Creatinine 0 70 mg/dL      Glucose 185 mg/dL      Calcium 9 9 mg/dL      AST 11 U/L      ALT 16 U/L      Alkaline Phosphatase 64 U/L      Total Protein 7 7 g/dL      Albumin 4 3 g/dL      Total Bilirubin 0 70 mg/dL      eGFR 131 ml/min/1 73sq m     Narrative:       [place] guidelines for Chronic Kidney Disease (CKD):     Stage 1 with normal or high GFR (GFR > 90 mL/min/1 73 square meters)    Stage 2 Mild CKD (GFR = 60-89 mL/min/1 73 square meters)    Stage 3A Moderate CKD (GFR = 45-59 mL/min/1 73 square meters)    Stage 3B Moderate CKD (GFR = 30-44 mL/min/1 73 square meters)    Stage 4 Severe CKD (GFR = 15-29 mL/min/1 73 square meters)    Stage 5 End Stage CKD (GFR <15 mL/min/1 73 square meters)  Note: GFR calculation is accurate only with a steady state creatinine    Lipase [[id number]]  (Normal) Collected:  12/03/19 2101    Lab Status:  Final result Specimen:  Blood from Arm, Right Updated:  12/03/19 2133     Lipase 356 u/L     CBC and differential [[id number]] Collected:  12/03/19 2101    Lab Status:  Final result Specimen:  Blood from Arm, Right Updated:  12/03/19 2109     WBC 6 84 Thousand/uL RBC 4 92 Million/uL      Hemoglobin 14 6 g/dL      Hematocrit 44 5 %      MCV 90 fL      MCH 29 7 pg      MCHC 32 8 g/dL      RDW 12 0 %      MPV 9 2 fL      Platelets 985 Thousands/uL      nRBC 0 /100 WBCs      Neutrophils Relative 69 %      Immat GRANS % 0 %      Lymphocytes Relative 22 %      Monocytes Relative 8 %      Eosinophils Relative 1 %      Basophils Relative 0 %      Neutrophils Absolute 4 68 Thousands/µL      Immature Grans Absolute 0 01 Thousand/uL      Lymphocytes Absolute 1 53 Thousands/µL      Monocytes Absolute 0 56 Thousand/µL      Eosinophils Absolute 0 04 Thousand/µL      Basophils Absolute 0 02 Thousands/µL     Urine Macroscopic, POC [[id number]]  (Abnormal) Collected:  12/03/19 2054    Lab Status:  Final result Specimen:  Urine Updated:  12/03/19 2054     Color, UA Yellow     Clarity, UA Clear     pH, UA 6 0     Leukocytes, UA Negative     Nitrite, UA Negative     Protein, UA 30 (1+) mg/dl      Glucose,  (1/2%) mg/dl      Ketones, UA Trace mg/dl      Urobilinogen, UA 0 2 E U /dl      Bilirubin, UA Negative     Blood, UA Small     Specific [place], UA 1 025    Narrative:       CLINITEK RESULT                 CT abdomen pelvis with contrast   Final Result by Xiomy Ortiz DO (12/04 0011)      Large amount of fecal material within the colon suggestive of constipation  Normal-appearing proximal portion and midportion of the appendix  The distal tip is not discretely visualized  Clinical correlation is recommended  Workstation performed: [id number]               Procedures  Procedures      ED Course                               MDM  Number of Diagnoses or Management Options  Nausea and vomiting:   Diagnosis management comments: 50-year-old male with history of diabetes presenting for the a month and a half of nausea and vomiting  Says it occurs every once in a while  Says is not made worse with eating  Denies any abdominal pain, headache, lightheadedness, dizziness  Patient has been taking his insulin as scheduled  Denies any loose stools  Vitals within normal limits except for mild tachycardia  Will obtain belly labs, will give IV Zofran and IV fluids  Per discussion with attending, concern for feculent vomiting  Will obtain CT abdomen pelvis with contrast  CT shows no acute findings  Patient remains nontender on exam special right lower quadrant  Patient able to tolerate p o  In the department  Will give script for Reglan for home  Told to follow up with his family doctor in regards to his diabetes and possible gastroparesis        Disposition  Final diagnoses:   Nausea and vomiting     Time reflects when diagnosis was documented in both MDM as applicable and the Disposition within this note     Time User Action Codes Description Comment    12/4/2019 12:35 AM Cassidy Murillo [R11 2] Nausea and vomiting       ED Disposition     ED Disposition Condition Date/Time Comment    Discharge Stable Wed Dec 4, 2019 12:35 AM Thomas Escalona discharge to home/self care              Follow-up Information     Follow up With Specialties Details Why Rodrigue 95, PA-C Internal Medicine, Physician Assistant Schedule an appointment as soon as possible for a visit in 1 day For follow up of symptoms [address]  [address] [facility]  [phone]            Discharge Medication List as of 12/4/2019 12:36 AM      START taking these medications    Details   metoclopramide (REGLAN) 10 mg tablet Take 1 tablet (10 mg total) by mouth every 6 (six) hours for 5 days, Starting Wed 12/4/2019, Until Mon 12/9/2019, Print         CONTINUE these medications which have NOT CHANGED    Details   ADMELOG SOLOSTAR 100 units/mL injection pen Admin insulin SQ TID with meals according to sliding scale, Normal      cyclobenzaprine (FLEXERIL) 10 mg tablet Take 1 tablet (10 mg total) by mouth 3 (three) times a day as needed for muscle spasms, Starting Tue 11/26/2019, Normal      glucose blood (FREESTYLE TEST STRIPS) test strip Use to test glucose 3-4 x a day, Normal      glucose monitoring kit (FREESTYLE) monitoring kit Use to test glucose 3-4 x a day, Normal      insulin aspart (NOVOLOG FLEXPEN) 100 Units/mL injection pen Admin insulin SQ TID with meals according to sliding scale, Normal      insulin glargine (LANTUS SOLOSTAR) 100 units/mL injection pen Inject 20 Units under the skin daily at bedtime, Starting Mon 11/11/2019, Normal      !! Insulin Pen Needle (PEN NEEDLES 31GX5/16") 31G X 8 MM MISC by Does not apply route, Starting Thu 5/15/2014, Historical Med      !! Insulin Pen Needle (PEN NEEDLES) 32G X 4 MM MISC Use to admin insulin SQ 4 times a day, Normal      Lancets (FREESTYLE) lancets Use to test glucose 3-4 x a day, Normal      lisinopril (ZESTRIL) 2 5 mg tablet Take 1 tablet (2 5 mg total) by mouth daily, Starting Wed 11/6/2019, Normal      meloxicam (MOBIC) 15 mg tablet Take 1 tablet (15 mg total) by mouth daily With food, Starting Tue 11/26/2019, Normal       !! - Potential duplicate medications found  Please discuss with provider  No discharge procedures on file  ED Provider  Attending physically available and evaluated [facility]  I managed the patient along with the ED Attending      Electronically Signed by         Arnetha Lombard, DO  12/05/19 6520